# Patient Record
Sex: MALE | Race: WHITE | Employment: FULL TIME | URBAN - METROPOLITAN AREA
[De-identification: names, ages, dates, MRNs, and addresses within clinical notes are randomized per-mention and may not be internally consistent; named-entity substitution may affect disease eponyms.]

---

## 2018-06-20 NOTE — PROGRESS NOTES
SUBJECTIVE:   Kain Varela is a 22 year old male who presents to clinic today for the following health issues:      Diabetes Follow-up      Patient is checking blood sugars: few times a week, average in low 200    Diabetic concerns: None     Symptoms of hypoglycemia (low blood sugar): none     Paresthesias (numbness or burning in feet) or sores: No     Date of last diabetic eye exam: about 1 year ago    He has not brought his glucometer today.  Used to have a PCP at [facility] ([facility]).  He has not seen a Diabetic Educator.  He does have health insurance now.   The soonest Endo appointment is on 10.31.18.    Patient takes 26 units of the long acting insulin (he has self decreased from the previous dose of the lantus, due to episodes of hypoglycemia).  Short acting insulin: currently uses as 1 unit per 9 grams of carbohydrates typically,the patient uses 60 units of this per day.   Patient has lows < 80 about once a week.       Hyperlipidemia Follow-Up      Rate your low fat/cholesterol diet?: fair    Taking statin?  No    Other lipid medications/supplements?:  none    Hypertension Follow-up      Outpatient blood pressures are not being checked.    Low Salt Diet: not monitoring salt    BP Readings from Last 2 Encounters:   07/05/18 131/84   07/23/15 129/72     Hemoglobin A1C (%)   Date Value   07/05/2018 12.2 (H)   03/13/2015 10.0 (H)     LDL Cholesterol Calculated (mg/dL)   Date Value   09/02/2014 86   07/18/2006 60     LDL Cholesterol Direct (mg/dL)   Date Value   07/05/2018 94       Amount of exercise or physical activity: Bikes to and from work daily     Problems taking medications regularly: No    Medication side effects: none    Diet: regular (no restrictions)          Reviewed and updated as needed this visit by clinical staff  Tobacco  Allergies  Meds  Problems  Med Hx  Surg Hx  Fam Hx  Soc Hx        Reviewed and updated as needed this visit by Provider  Meds  Problems      "    Patient Active Problem List   Diagnosis     Type 1 diabetes mellitus (H)     Uncontrolled type 1 diabetes mellitus (H)     Midline low back pain without sciatica     Neck pain     Arthralgia     Encounter for long-term (current) use of insulin (H)     Type 1 diabetes, HbA1c goal < 7% (H)       Past Medical History:   Diagnosis Date     Type 1 diabetes mellitus (H)        Past Surgical History:   Procedure Laterality Date     NO HISTORY OF SURGERY         Family History   Problem Relation Age of Onset     Asthma Mother        Social History   Substance Use Topics     Smoking status: Never Smoker     Smokeless tobacco: Never Used     Alcohol use No       Current Outpatient Prescriptions   Medication     ACE/ARB/ARNI NOT PRESCRIBED, INTENTIONAL,     busPIRone (BUSPAR) 15 MG tablet     FLUoxetine (PROZAC) 20 MG capsule     fluticasone (FLONASE) 50 MCG/ACT spray     glucagon (GLUCAGON EMERGENCY) 1 MG KIT     insulin syringe-needle U-100 (BD INSULIN SYRINGE ULTRAFINE) 31G X 5/16\" 0.5 ML MISC     montelukast (SINGULAIR) 10 MG tablet     [DISCONTINUED] insulin aspart (NOVOLOG) 100 UNITS/ML VIAL     [DISCONTINUED] insulin glargine (LANTUS VIAL) 100 UNIT/ML vial     insulin aspart (NOVOLOG) 100 UNITS/ML injection     insulin glargine (LANTUS VIAL) 100 UNIT/ML injection     [DISCONTINUED] insulin glargine (LANTUS VIAL) 100 UNIT/ML injection     No current facility-administered medications for this visit.          ROS:  Constitutional, HEENT, cardiovascular, pulmonary, GI, , musculoskeletal, neuro, skin, endocrine and psych systems are negative, except as otherwise noted.     OBJECTIVE:                                                    /84  Pulse 93  Temp 97.9  F (36.6  C) (Oral)  Ht 5' 7.91\" (1.725 m)  Wt 147 lb (66.7 kg)  SpO2 97%  BMI 22.41 kg/m2     GENERAL APPEARANCE: healthy, alert and in no distress  EYES: Eyes grossly normal to inspection, and conjunctivae and sclerae normal  HENT: head normocephalic " and atraumatic and mouth without ulcers or lesions, oropharynx clear and oral mucous membranes moist  NECK: no noticeable adenopathy, no asymmetry, masses, or scars   RESP: lungs clear to auscultation - no rales, rhonchi or wheezes  CV: regular rate and rhythm, normal S1 S2, no S3 or S4, no murmur, click or rub, no peripheral edema and peripheral pulses strong  ABDOMEN: soft, nontender, no hepatosplenomegaly, no masses and bowel sounds normal  MS: no musculoskeletal defects are noted and gait is age appropriate without ataxia  SKIN: no suspicious lesions or rashes  NEURO: mentation intact and speech normal  PSYCH: mentation appears normal and affect normal/bright.    Results for orders placed or performed in visit on 07/05/18   HIV Screening   Result Value Ref Range    HIV Antigen Antibody Combo Nonreactive NR^Nonreactive       CREATININE   Result Value Ref Range    Creatinine 0.77 0.66 - 1.25 mg/dL    GFR Estimate >90 >60 mL/min/1.7m2    GFR Estimate If Black >90 >60 mL/min/1.7m2   HEMOGLOBIN A1C   Result Value Ref Range    Hemoglobin A1C 12.2 (H) 0 - 5.6 %   TSH with free T4 reflex   Result Value Ref Range    TSH 1.76 0.40 - 4.00 mU/L   LDL cholesterol direct   Result Value Ref Range    LDL Cholesterol Direct 94 <100 mg/dL       No results found for this or any previous visit (from the past 744 hour(s)).      ASSESSMENT/PLAN:                                                        ICD-10-CM    1. Type 1 diabetes mellitus without complication (H) E10.9 CREATININE     HEMOGLOBIN A1C     ACE/ARB/ARNI NOT PRESCRIBED, INTENTIONAL,     TSH with free T4 reflex     LDL cholesterol direct   2. Encounter for long-term (current) use of insulin (H) Z79.4    3. Screening for HIV (human immunodeficiency virus) Z11.4 HIV Screening   4. Screening for diabetic peripheral neuropathy Z13.89    5. Need for prophylactic vaccination against Streptococcus pneumoniae (pneumococcus) Z23    6. Need for prophylactic vaccination with  tetanus-diphtheria (TD) Z23      Worsening control of T1DM:  See the July 5, 2018 telephone encounter.      Patient Instructions   We will let you know your test results as discussed: by phone for urgent results, otherwise by postal mail.    I will be able to send the prescription for your insulins once today's lab tests process.     Please return to clinic in 2 weeks for a follow-up appointment for your Diabetes.           Amy Singh MD    [place] [address] [facility] [facility] [address] [phone]

## 2018-07-05 ENCOUNTER — TELEPHONE (OUTPATIENT)
Dept: INTERNAL MEDICINE | Facility: CLINIC | Age: 23
End: 2018-07-05

## 2018-07-05 ENCOUNTER — OFFICE VISIT (OUTPATIENT)
Dept: INTERNAL MEDICINE | Facility: CLINIC | Age: 23
End: 2018-07-05
Payer: COMMERCIAL

## 2018-07-05 VITALS
HEIGHT: 68 IN | DIASTOLIC BLOOD PRESSURE: 84 MMHG | WEIGHT: 147 LBS | SYSTOLIC BLOOD PRESSURE: 131 MMHG | HEART RATE: 93 BPM | TEMPERATURE: 97.9 F | OXYGEN SATURATION: 97 % | BODY MASS INDEX: 22.28 KG/M2

## 2018-07-05 DIAGNOSIS — Z23 NEED FOR PROPHYLACTIC VACCINATION AGAINST STREPTOCOCCUS PNEUMONIAE (PNEUMOCOCCUS): ICD-10-CM

## 2018-07-05 DIAGNOSIS — Z23 NEED FOR PROPHYLACTIC VACCINATION WITH TETANUS-DIPHTHERIA (TD): ICD-10-CM

## 2018-07-05 DIAGNOSIS — Z11.4 SCREENING FOR HIV (HUMAN IMMUNODEFICIENCY VIRUS): ICD-10-CM

## 2018-07-05 DIAGNOSIS — Z79.4 ENCOUNTER FOR LONG-TERM (CURRENT) USE OF INSULIN (H): ICD-10-CM

## 2018-07-05 DIAGNOSIS — E10.9 TYPE 1 DIABETES MELLITUS WITHOUT COMPLICATION (H): Primary | ICD-10-CM

## 2018-07-05 DIAGNOSIS — Z13.89 SCREENING FOR DIABETIC PERIPHERAL NEUROPATHY: ICD-10-CM

## 2018-07-05 LAB
CREAT SERPL-MCNC: 0.77 MG/DL (ref 0.66–1.25)
GFR SERPL CREATININE-BSD FRML MDRD: >90 ML/MIN/1.7M2
HBA1C MFR BLD: 12.2 % (ref 0–5.6)
HIV 1+2 AB+HIV1 P24 AG SERPL QL IA: NONREACTIVE
LDLC SERPL DIRECT ASSAY-MCNC: 94 MG/DL
TSH SERPL DL<=0.005 MIU/L-ACNC: 1.76 MU/L (ref 0.4–4)

## 2018-07-05 PROCEDURE — 83036 HEMOGLOBIN GLYCOSYLATED A1C: CPT | Performed by: INTERNAL MEDICINE

## 2018-07-05 PROCEDURE — 84443 ASSAY THYROID STIM HORMONE: CPT | Performed by: INTERNAL MEDICINE

## 2018-07-05 PROCEDURE — 82565 ASSAY OF CREATININE: CPT | Performed by: INTERNAL MEDICINE

## 2018-07-05 PROCEDURE — 36415 COLL VENOUS BLD VENIPUNCTURE: CPT | Performed by: INTERNAL MEDICINE

## 2018-07-05 PROCEDURE — 99214 OFFICE O/P EST MOD 30 MIN: CPT | Performed by: INTERNAL MEDICINE

## 2018-07-05 PROCEDURE — 87389 HIV-1 AG W/HIV-1&-2 AB AG IA: CPT | Performed by: INTERNAL MEDICINE

## 2018-07-05 PROCEDURE — 83721 ASSAY OF BLOOD LIPOPROTEIN: CPT | Performed by: INTERNAL MEDICINE

## 2018-07-05 RX ORDER — BUSPIRONE HYDROCHLORIDE 15 MG/1
15 TABLET ORAL 3 TIMES DAILY
COMMUNITY
Start: 2017-10-26 | End: 2019-03-15

## 2018-07-05 RX ORDER — FLUTICASONE PROPIONATE 50 MCG
2 SPRAY, SUSPENSION (ML) NASAL 2 TIMES DAILY PRN
COMMUNITY
Start: 2017-06-20

## 2018-07-05 RX ORDER — INSULIN GLARGINE 100 [IU]/ML
INJECTION, SOLUTION SUBCUTANEOUS
Qty: 10 ML | Refills: 1 | Status: SHIPPED | OUTPATIENT
Start: 2018-07-05 | End: 2018-07-06

## 2018-07-05 RX ORDER — INSULIN GLARGINE 100 [IU]/ML
INJECTION, SOLUTION SUBCUTANEOUS
Qty: 10 ML | Refills: 1 | Status: SHIPPED | OUTPATIENT
Start: 2018-07-05 | End: 2018-07-05

## 2018-07-05 RX ORDER — INSULIN GLARGINE 100 [IU]/ML
INJECTION, SOLUTION SUBCUTANEOUS DAILY
Qty: 30 ML | Refills: 1 | Status: CANCELLED | OUTPATIENT
Start: 2018-07-05

## 2018-07-05 RX ORDER — MONTELUKAST SODIUM 10 MG/1
10 TABLET ORAL DAILY
COMMUNITY
Start: 2017-06-20

## 2018-07-05 NOTE — MR AVS SNAPSHOT
After Visit Summary   7/5/2018    Kain Varela    MRN: [id number]           Patient Information     Date Of Birth          1995        Visit Information        Provider Department      7/5/2018 8:10 AM Amy Singh MD [facility]        Today's Diagnoses     Type 1 diabetes mellitus without complication (H)    -  1    Screening for HIV (human immunodeficiency virus)        Screening for diabetic peripheral neuropathy        Need for prophylactic vaccination against Streptococcus pneumoniae (pneumococcus)        Need for prophylactic vaccination with tetanus-diphtheria (TD)          Care Instructions    We will let you know your test results as discussed: by phone for urgent results, otherwise by postal mail.    I will be able to send the prescription for your insulins once today's lab tests process.     Please return to clinic in 2 weeks for a follow-up appointment for your Diabetes.               Follow-ups after your visit        Your next 10 appointments already scheduled     Oct 31, 2018  9:00 AM CDT   New Visit with Kiki Campos MD   [facility]    [address] [place] [address] [phone]              Who to contact     If you have questions or need follow up information about today's clinic visit or your schedule please contact [facility] directly at [phone].  Normal or non-critical lab and imaging results will be communicated to you by MyChart, letter or phone within 4 business days after the clinic has received the results. If you do not hear from us within 7 days, please contact the clinic through MyChart or phone. If you have a critical or abnormal lab result, we will notify you by phone as soon as possible.  Submit refill requests through [employer] or call your pharmacy and they will forward the refill request to us. Please allow 3 business days for your  "refill to be completed.          Additional Information About Your Visit        Care EveryWhere ID     This is your Care EveryWhere ID. This could be used by other organizations to access your [place] medical records  [id number]        Your Vitals Were     Pulse Temperature Height Pulse Oximetry BMI (Body Mass Index)       93 97.9  F (36.6  C) (Oral) 5' 7.91\" (1.725 m) 97% 22.41 kg/m2        Blood Pressure from Last 3 Encounters:   07/05/18 131/84   07/23/15 129/72   07/16/15 138/80    Weight from Last 3 Encounters:   07/05/18 147 lb (66.7 kg)   07/23/15 156 lb (70.8 kg) (51 %)*   07/16/15 158 lb (71.7 kg) (54 %)*     * Growth percentiles are based on [facility] 2-20 Years data.              We Performed the Following     CREATININE     HEMOGLOBIN A1C     HIV Screening     LDL cholesterol direct     TSH with free T4 reflex          Today's Medication Changes          These changes are accurate as of 7/5/18  9:00 AM.  If you have any questions, ask your nurse or doctor.               Start taking these medicines.        Dose/Directions    ACE/ARB/ARNI NOT PRESCRIBED (INTENTIONAL)   Used for:  Type 1 diabetes mellitus without complication (H)   Started by:  Amy Singh MD        Please choose reason not prescribed, below   Refills:  0            Where to get your medicines      Some of these will need a paper prescription and others can be bought over the counter.  Ask your nurse if you have questions.     You don't need a prescription for these medications     ACE/ARB/ARNI NOT PRESCRIBED (INTENTIONAL)                Primary Care Provider Office Phone # Fax #    Amy Singh [phone] [address] [address] [address]        Equal Access to Services     DOMINIK ROSE AH: Bigg Teran, brandyn noe, odalys wilson, mikayla uribe. So [facility] [place] [facility] [phone].    ATENCIÓN: Si habla español, tiene a wagner disposición " servicios gratuitos de asistencia lingüística. Mo marsh [phone].    We comply with applicable federal civil rights laws and [place] laws. We do not discriminate on the basis of race, color, national origin, age, disability, sex, sexual orientation, or gender identity.            Thank you!     Thank you for choosing [facility]  [facility] your [facility]. Our goal is always to provide you with excellent care. Hearing back from our patients is one way we can continue to improve our services. Please take a few minutes to complete the written survey that you may receive in the mail after your visit with us. Thank you!             Your Updated Medication List - Protect others around you: Learn how to safely use, store and throw away your medicines at www.disposemymeds.org.          This list is accurate as of 7/5/18  9:00 AM.  Always use your most recent med list.                   Brand Name Dispense Instructions for use Diagnosis    ACE/ARB/ARNI NOT PRESCRIBED (INTENTIONAL)      Please choose reason not prescribed, below    Type 1 diabetes mellitus without complication (H)       busPIRone 15 MG tablet    BUSPAR     Take 15 mg by mouth 3 times daily        FLUoxetine 20 MG capsule    PROzac     Take 60 mg by mouth daily        fluticasone 50 MCG/ACT spray    FLONASE     Spray 2 sprays in nostril 2 times daily as needed        glucagon 1 MG kit    GLUCAGON EMERGENCY    2 Kit    inject 1 mg as directed as needed.    Diabetes mellitus, type 1       insulin aspart 100 UNITS/ML injection    NovoLOG    30 mL    Inject  Subcutaneous. 1 unit per 8 grams of carbohydrates, up to 100 units per day.    Type I (juvenile type) diabetes mellitus without mention of complication, uncontrolled       insulin glargine 100 UNIT/ML injection    LANTUS VIAL    10 mL    32 units once daily around 7am.  Open a new vial every 30 days.    Type I (juvenile type) diabetes mellitus without mention of complication, not stated as uncontrolled  "      insulin syringe-needle U-100 31G X 5/16\" 0.5 ML    BD insulin syringe ultrafine    200 each    Use 5-7 syringes daily for insulin injections    Type I (juvenile type) diabetes mellitus without mention of complication, not stated as uncontrolled       montelukast 10 MG tablet    SINGULAIR     Take 10 mg by mouth daily          "

## 2018-07-05 NOTE — LETTER
July 11, 2018    Kain Varela  [address] [address] [address]    Dear Kain,       We have been trying to reach you by phone. Dr Singh sent 2 new insulin prescriptions to your pharmacy, replacing the lantus and novolog.          Thank you,   Your [facility] Team/  [phone]

## 2018-07-05 NOTE — PATIENT INSTRUCTIONS
We will let you know your test results as discussed: by phone for urgent results, otherwise by postal mail.    I will be able to send the prescription for your insulins once today's lab tests process.     Please return to clinic in 2 weeks for a follow-up appointment for your Diabetes.

## 2018-07-06 RX ORDER — INSULIN GLARGINE 100 [IU]/ML
INJECTION, SOLUTION SUBCUTANEOUS
Qty: 15 ML | Refills: 0 | Status: SHIPPED | OUTPATIENT
Start: 2018-07-06 | End: 2018-07-20

## 2018-07-06 NOTE — TELEPHONE ENCOUNTER
Please call and advise the patient as follows, and also send a letter with the same text and attach recent test results [statements which are in bold and in square brackets, like this sentence, is for clinic staff and should not be included in the text of the letter to the patient]:      Dear Kain,  Your diabetic control has unfortunately worsened from an A1C of approximately 10% up to 12% now.   Please increase the Lantus from 26 units to 29 units. Continue to increase the Lantus by 3 units at a time, every 2 days, until you have a blood sugar measurement of below 90.   Increase the Novolog from 1 unit of insulin  per 9 grams of carbohydrates to 1 unit per 8 grams of carbohydrates.   New prescriptions of Lantus and Novolog will be sent now to your pharmacy.   Please check your blood sugars at the very least every morning, fasting.  It would also be helpful to have a record of your premeal blood sugars.   Please bring your glucometer to each doctor s appointment and to each appointment with the Diabetic Educator.  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  We will likely refer you to a Diabetic Educator at your next appointment, to continue to work on optimizing your diabetes.   Let us know if you have any questions.  ~Dr SYED

## 2018-07-06 NOTE — TELEPHONE ENCOUNTER
I've sent the other insulin prescriptions, to be filled instead of lantus and novolog.      Thank you,  Amy Singh MD

## 2018-07-06 NOTE — TELEPHONE ENCOUNTER
Patient informed of provider note as below  Patient verbalized understanding  Patient states he had mentioned at his visit he was wanting to switch from Lantus to Basaglar and Novolog to Humulog for cost reasons, they are much cheaper for patient.    To provider to advise  Amairani LEONARDO, RN, CPN

## 2018-07-06 NOTE — TELEPHONE ENCOUNTER
"Call to patient - \"mailbox is not set up yet\", unable to leave message    Amairani DEL VALLEN, RN, CPN    "

## 2018-07-09 NOTE — TELEPHONE ENCOUNTER
"Called patient and received recording that \"Voicemail mail box has not been set up yet\".  Unable to leave message.   Will attempt again later.     Kelly DEL VALLEN, RN    "

## 2018-07-09 NOTE — TELEPHONE ENCOUNTER
Called patient again and received the same recording.   Unable to leave voicemail.    Called pharmacy where the 2 new Rxs were sent and they have not yet been picked up.  Pharmacy had a different phone number listed so I called that one and his mother answered.   She said patient works at night and sleeps during the day. He usually wakes up around 4pm.    Kelly DEL VALLEN, RN

## 2018-07-11 ENCOUNTER — TELEPHONE (OUTPATIENT)
Dept: INTERNAL MEDICINE | Facility: CLINIC | Age: 23
End: 2018-07-11

## 2018-07-11 NOTE — TELEPHONE ENCOUNTER
Prior Authorization Retail Medication Request    Medication/Dose: BASAGLAR 100 UNIT/ML injection  ICD code (if different than what is on RX):  Type 1 diabetes mellitus without complication (H) [E10.9]   Previously Tried and Failed:    Rationale:     Insurance Name:  [employer]  Insurance ID:  [id number]  BIN: [id number]  PCN: [id number]  Phone #: [phone]      Pharmacy Information (if different than what is on RX)  Name:  [facility]  Phone:  [phone]

## 2018-07-11 NOTE — TELEPHONE ENCOUNTER
RN has been unable to reach patient by phone to let him know that 2 new prescriptions have been sent to his pharmacy.    TC, please mail letter informing him that Dr Singh sent 2 new insulin prescriptions to his pharmacy, replacing the lantus and novolog.    Kelly DEL VALLEN, RN

## 2018-07-11 NOTE — TELEPHONE ENCOUNTER
Prior Authorization Not Needed per Insurance    Medication: BASAGLAR 100 UNIT/ML injection No PA needed   Insurance Company: [employer] - Phone [phone] Fax [phone]  Expected CoPay:      Pharmacy Filling the Rx: [id number] - [place], [place] - [address] [facility] [facility]  Pharmacy Notified: Yes  Patient Notified: Yes    Confirmed with pharmacy patient filled the Basagar Kwikpen recently and does not need a PA.  They also received, however, 2 other Rx's for Lantus and Humalog.  They are confused as to which one patient needs to be on before they dispense medication.  Please call pharmacy [facility] [phone] to clarify which medication patient should be on.  Thank you.

## 2018-07-12 ENCOUNTER — NURSE TRIAGE (OUTPATIENT)
Dept: NURSING | Facility: CLINIC | Age: 23
End: 2018-07-12

## 2018-07-12 NOTE — TELEPHONE ENCOUNTER
Calling because the clinic called him yesterday.  Read him the note.  He will call the pharmacy in [place]   Anuel Harrison RN [phone]

## 2018-07-18 NOTE — PROGRESS NOTES
"  SUBJECTIVE:   Kain Varela is a 22 year old male who presents to clinic today for the following health issues:      Diabetes Follow-up    Patient is checking blood sugars: twice daily.    Blood sugar testing frequency justification: On insulin, frequency appropriate   Results are as follows:         Varies: 70s (4 msmts) and 300-400s; average: 200 or above.    Diabetic concerns: None     Symptoms of hypoglycemia (low blood sugar): shaky, dizzy, weak, lethargy, blurred vision, confusion     Paresthesias (numbness or burning in feet) or sores: No     Date of last diabetic eye exam: utd    At the last appointment 2 weeks ago, we increased the long acting insulin from Lantus 26u at bedtime to Basaglar (per insurance coverage) 29 unit(s) at bedtime, and the short acting insulin from 1 unit insulin per 9 grams of carbs to 1 unit per 8 grams of carbs, tid (sent Novolog then Humalog, but the patient is saying that he picked up Novolog as this is what is covered by his insurance now).  He has had lows: 60-70s, \"almost daily,\" (on glucometer, 3-4 times in the past 2 weeks); did not have this before.   No hypoglycemic symptoms have been noted when the patient drove his car.  He has brought his glucometer with him today:  His Endo appointment is on 10.31.18.  He works 3rd shift:   Goes to sleep at 10am and wakes up at about 7pm. So his fasting blood sugars are around 7pm.     Patient feels a low blood sugar, does not always check his blood sugar at that time, but eats some candy. Does not check 15 min after, to check if he corrected or if he \"overcorrected\" his blood sugars. Also, says he is often too busy to check it at work, sometimes feels really shaky, and has to eat something.     His [employer] driving form needs to be filled out today-he had missed the deadline and had his drivers license suspended for 2 months.    Diabetes Management Resources    Hyperlipidemia Follow-Up      Rate your low " fat/cholesterol diet?: not monitoring fat    Taking statin?  No    Other lipid medications/supplements?:  none    Hypertension Follow-up      Outpatient blood pressures are not being checked.    Low Salt Diet: NA    BP Readings from Last 2 Encounters:   07/20/18 124/68   07/05/18 131/84     Hemoglobin A1C (%)   Date Value   07/05/2018 12.2 (H)   03/13/2015 10.0 (H)     LDL Cholesterol Calculated (mg/dL)   Date Value   09/02/2014 86   07/18/2006 60     LDL Cholesterol Direct (mg/dL)   Date Value   07/05/2018 94       Amount of exercise or physical activity: 4-5 days/week for an average of 45-60 minutes    Problems taking medications regularly: No    Medication side effects: none    Diet: carbohydrate counting        Reviewed and updated as needed this visit by clinical staff  Tobacco  Allergies  Meds  Problems  Med Hx  Surg Hx  Fam Hx  Soc Hx        Reviewed and updated as needed this visit by Provider  Meds  Problems           Patient Active Problem List   Diagnosis     Type 1 diabetes mellitus (H)     Uncontrolled type 1 diabetes mellitus (H)     Midline low back pain without sciatica     Neck pain     Arthralgia     Encounter for long-term (current) use of insulin (H)     Type 1 diabetes, HbA1c goal < 7% (H)       Past Medical History:   Diagnosis Date     Type 1 diabetes mellitus (H)        Past Surgical History:   Procedure Laterality Date     NO HISTORY OF SURGERY         Family History   Problem Relation Age of Onset     Asthma Mother        Social History   Substance Use Topics     Smoking status: Never Smoker     Smokeless tobacco: Never Used     Alcohol use No       Current Outpatient Prescriptions   Medication     ACE/ARB/ARNI NOT PRESCRIBED, INTENTIONAL,     BASAGLAR 100 UNIT/ML injection     busPIRone (BUSPAR) 15 MG tablet     FLUoxetine (PROZAC) 20 MG capsule     fluticasone (FLONASE) 50 MCG/ACT spray     glucagon (GLUCAGON EMERGENCY) 1 MG KIT     insulin aspart (NOVOLOG VIAL) 100 UNITS/ML  "injection     insulin syringe-needle U-100 (BD INSULIN SYRINGE ULTRAFINE) 31G X 5/16\" 0.5 ML MISC     montelukast (SINGULAIR) 10 MG tablet     No current facility-administered medications for this visit.          ROS:  Constitutional, HEENT, cardiovascular, pulmonary, GI, , musculoskeletal, neuro, skin, endocrine and psych systems are negative, except as otherwise noted.     OBJECTIVE:                                                    /68  Pulse 76  Temp 97.9  F (36.6  C) (Oral)  Wt 152 lb (68.9 kg)  SpO2 99%  BMI 23.17 kg/m2     GENERAL APPEARANCE: healthy, alert and in no distress  EYES: Eyes grossly normal to inspection, and conjunctivae and sclerae normal  HENT: head normocephalic and atraumatic and mouth without ulcers or lesions, oropharynx clear and oral mucous membranes moist  NECK: no noticeable adenopathy, no asymmetry, masses, or scars   RESP: lungs clear to auscultation - no rales, rhonchi or wheezes  CV: regular rate and rhythm, normal S1 S2, no S3 or S4, no murmur, click or rub, no peripheral edema and peripheral pulses strong  ABDOMEN: soft, nontender, no hepatosplenomegaly, no masses and bowel sounds normal  MS: no musculoskeletal defects are noted and gait is age appropriate without ataxia  SKIN: no suspicious lesions or rashes  NEURO: mentation intact and speech normal  PSYCH: mentation appears normal and affect normal/bright.    Results for orders placed or performed in visit on 07/05/18   HIV Screening   Result Value Ref Range    HIV Antigen Antibody Combo Nonreactive NR^Nonreactive       CREATININE   Result Value Ref Range    Creatinine 0.77 0.66 - 1.25 mg/dL    GFR Estimate >90 >60 mL/min/1.7m2    GFR Estimate If Black >90 >60 mL/min/1.7m2   HEMOGLOBIN A1C   Result Value Ref Range    Hemoglobin A1C 12.2 (H) 0 - 5.6 %   TSH with free T4 reflex   Result Value Ref Range    TSH 1.76 0.40 - 4.00 mU/L   LDL cholesterol direct   Result Value Ref Range    LDL Cholesterol Direct 94 <100 " mg/dL       No results found for this or any previous visit (from the past 744 hour(s)).      ASSESSMENT/PLAN:                                                        ICD-10-CM    1. Type 1 diabetes mellitus without complication (H) E10.9 insulin aspart (NOVOLOG VIAL) 100 UNITS/ML injection     BASAGLAR 100 UNIT/ML injection   2. Uncontrolled type 1 diabetes mellitus without complication (H) E10.65    3. Screening for diabetic peripheral neuropathy Z13.89    4. Need for prophylactic vaccination against Streptococcus pneumoniae (pneumococcus) Z23    5. Need for prophylactic vaccination with tetanus-diphtheria (TD) Z23        Patient Instructions   Continue your current Basaglar and Novolog.    Please try to eat a 15 gram carbohydrate snack (2-3 snacks per 24 hour day) at times of the day that you are likely to get low blood sugars.    When you do feel symptoms of low blood sugars, try to check your sugar, eat a snack and recheck your sugar 15 minutes later. That way, you will know 1) if you are really low or not 2) if the snack that you ate corrected your blood sugar, or was it too much or too little.    Please call to schedule an appointment with the Diabetic Educator, soon: [phone].    Please return to clinic in 3 weeks and bring your glucometer with you.              Amy Singh MD    [facility]  [address] [address] [facility] [address] [phone]

## 2018-07-20 ENCOUNTER — OFFICE VISIT (OUTPATIENT)
Dept: INTERNAL MEDICINE | Facility: CLINIC | Age: 23
End: 2018-07-20
Payer: COMMERCIAL

## 2018-07-20 VITALS
SYSTOLIC BLOOD PRESSURE: 124 MMHG | HEART RATE: 76 BPM | OXYGEN SATURATION: 99 % | BODY MASS INDEX: 23.17 KG/M2 | WEIGHT: 152 LBS | DIASTOLIC BLOOD PRESSURE: 68 MMHG | TEMPERATURE: 97.9 F

## 2018-07-20 DIAGNOSIS — Z23 NEED FOR PROPHYLACTIC VACCINATION WITH TETANUS-DIPHTHERIA (TD): ICD-10-CM

## 2018-07-20 DIAGNOSIS — E10.9 TYPE 1 DIABETES MELLITUS WITHOUT COMPLICATION (H): Primary | ICD-10-CM

## 2018-07-20 DIAGNOSIS — Z13.89 SCREENING FOR DIABETIC PERIPHERAL NEUROPATHY: ICD-10-CM

## 2018-07-20 DIAGNOSIS — Z23 NEED FOR PROPHYLACTIC VACCINATION AGAINST STREPTOCOCCUS PNEUMONIAE (PNEUMOCOCCUS): ICD-10-CM

## 2018-07-20 PROCEDURE — 99214 OFFICE O/P EST MOD 30 MIN: CPT | Performed by: INTERNAL MEDICINE

## 2018-07-20 RX ORDER — INSULIN GLARGINE 100 [IU]/ML
INJECTION, SOLUTION SUBCUTANEOUS
Qty: 15 ML | Refills: 0 | Status: SHIPPED | OUTPATIENT
Start: 2018-07-20 | End: 2019-06-17

## 2018-07-20 NOTE — PATIENT INSTRUCTIONS
Continue your current Basaglar and Novolog.    Please try to eat a 15 gram carbohydrate snack (2-3 snacks per 24 hour day) at times of the day that you are likely to get low blood sugars.    When you do feel symptoms of low blood sugars, try to check your sugar, eat a snack and recheck your sugar 15 minutes later. That way, you will know 1) if you are really low or not 2) if the snack that you ate corrected your blood sugar, or was it too much or too little.    Please call to schedule an appointment with the Diabetic Educator, soon: [phone].    Please return to clinic in 3 weeks and bring your glucometer with you.

## 2018-07-20 NOTE — MR AVS SNAPSHOT
After Visit Summary   7/20/2018    Kain Varela    MRN: [id number]           Patient Information     Date Of Birth          1995        Visit Information        Provider Department      7/20/2018 8:10 AM Amy Singh MD [facility]        Today's Diagnoses     Type 1 diabetes mellitus without complication (H)    -  1    Screening for diabetic peripheral neuropathy        Need for prophylactic vaccination against Streptococcus pneumoniae (pneumococcus)        Need for prophylactic vaccination with tetanus-diphtheria (TD)          Care Instructions    Continue your current Basaglar and Novolog.    Please try to eat a 15 gram carbohydrate snack (2-3 snacks per 24 hour day) at times of the day that you are likely to get low blood sugars.    When you do feel symptoms of low blood sugars, try to check your sugar, eat a snack and recheck your sugar 15 minutes later. That way, you will know 1) if you are really low or not 2) if the snack that you ate corrected your blood sugar, or was it too much or too little.    Please call to schedule an appointment with the Diabetic Educator, soon: [phone].    Please return to clinic in 3 weeks and bring your glucometer with you.                  Follow-ups after your visit        Your next 10 appointments already scheduled     Oct 31, 2018  9:00 AM CDT   New Visit with Kiki Campos MD   [facility] ([facility])    [address] [place] [address] [phone]              Who to contact     If you have questions or need follow up information about today's clinic visit or your schedule please contact [facility] directly at [phone].  Normal or non-critical lab and imaging results will be communicated to you by MyChart, letter or phone within 4 business days after the clinic has received the results. If you do not hear from us within 7 days, please  contact the clinic through eReceipts or phone. If you have a critical or abnormal lab result, we will notify you by phone as soon as possible.  Submit refill requests through eReceipts or call your pharmacy and they will forward the refill request to us. Please allow 3 business days for your refill to be completed.          Additional Information About Your Visit        Care EveryWhere ID     This is your Care EveryWhere ID. This could be used by other organizations to access your [facility] medical records  [id number]        Your Vitals Were     Pulse Temperature Pulse Oximetry BMI (Body Mass Index)          76 97.9  F (36.6  C) (Oral) 99% 23.17 kg/m2         Blood Pressure from Last 3 Encounters:   07/20/18 124/68   07/05/18 131/84   07/23/15 129/72    Weight from Last 3 Encounters:   07/20/18 152 lb (68.9 kg)   07/05/18 147 lb (66.7 kg)   07/23/15 156 lb (70.8 kg) (51 %)*     * Growth percentiles are based on [facility] 2-20 Years data.              Today, you had the following     No orders found for display         Today's Medication Changes          These changes are accurate as of 7/20/18  9:19 AM.  If you have any questions, ask your nurse or doctor.               These medicines have changed or have updated prescriptions.        Dose/Directions    insulin aspart 100 UNITS/ML injection   Commonly known as:  NovoLOG VIAL   Indication:  1 unit per 8 g of carbs   This may have changed:    - how to take this  - when to take this  - additional instructions   Used for:  Type 1 diabetes mellitus without complication (H)   Changed by:  Amy Singh MD        1 unit insulin subcutaneous, per 8 grams of carbohydrates typically. Max 100 units of this per day.   Quantity:  15 mL   Refills:  0         Stop taking these medicines if you haven't already. Please contact your care team if you have questions.     insulin lispro 100 UNIT/ML injection   Commonly known as:  HumaLOG KWIKpen   Stopped by:  Amy Singh  MD Mildred                Where to get your medicines      These medications were sent to [facility] #[id number] - [place], [place] - [address],  [address]  [address], , [facility] [address]     Phone:  [phone]     BASAGLAR 100 UNIT/ML injection    insulin aspart 100 UNITS/ML injection                Primary Care Provider Office Phone # Fax #    Amy Singh [phone] [address] [address] [facility] [address]        Equal Access to Services     [facility]: Hadii aad ku hadasho Soomaali, waaxda luqadaha, qaybta kaalmada adeegyada, mikayla gan . So [facility] [phone].    ATENCIÓN: Si habla español, tiene a wagner disposición servicios gratuitos de asistencia lingüística. [facility] [phone].    We comply with applicable federal civil rights laws and [place] laws. We do not discriminate on the basis of race, color, national origin, age, disability, sex, sexual orientation, or gender identity.            Thank you!     Thank you for choosing [facility]  for your care. Our goal is always to provide you with excellent care. Hearing back from our patients is one way we can continue to improve our services. Please take a few minutes to complete the written survey that you may receive in the mail after your visit with us. Thank you!             Your Updated Medication List - Protect others around you: Learn how to safely use, store and throw away your medicines at www.disposemymeds.org.          This list is accurate as of 7/20/18  9:19 AM.  Always use your most recent med list.                   Brand Name Dispense Instructions for use Diagnosis    ACE/ARB/ARNI NOT PRESCRIBED (INTENTIONAL)      Please choose reason not prescribed, below    Type 1 diabetes mellitus without complication (H)       BASAGLAR 100 UNIT/ML injection     15 mL    29 units once SQ daily.  Open a new vial every 30 days.  "   Type 1 diabetes mellitus without complication (H)       busPIRone 15 MG tablet    BUSPAR     Take 15 mg by mouth 3 times daily        FLUoxetine 20 MG capsule    PROzac     Take 60 mg by mouth daily        fluticasone 50 MCG/ACT spray    FLONASE     Spray 2 sprays in nostril 2 times daily as needed        glucagon 1 MG kit    GLUCAGON EMERGENCY    2 Kit    inject 1 mg as directed as needed.    Diabetes mellitus, type 1       insulin aspart 100 UNITS/ML injection    NovoLOG VIAL    15 mL    1 unit insulin subcutaneous, per 8 grams of carbohydrates typically. Max 100 units of this per day.    Type 1 diabetes mellitus without complication (H)       insulin syringe-needle U-100 31G X 5/16\" 0.5 ML    BD insulin syringe ultrafine    200 each    Use 5-7 syringes daily for insulin injections    Type I (juvenile type) diabetes mellitus without mention of complication, not stated as uncontrolled       montelukast 10 MG tablet    SINGULAIR     Take 10 mg by mouth daily          "

## 2018-07-24 ENCOUNTER — TELEPHONE (OUTPATIENT)
Dept: INTERNAL MEDICINE | Facility: CLINIC | Age: 23
End: 2018-07-24

## 2018-07-25 ENCOUNTER — TELEPHONE (OUTPATIENT)
Dept: INTERNAL MEDICINE | Facility: CLINIC | Age: 23
End: 2018-07-25

## 2018-08-23 NOTE — PROGRESS NOTES
"  SUBJECTIVE:   Kain Varela is a 23 year old male who presents to clinic today for the following health issues:      Diabetes Follow-up    Patient is checking blood sugars: twice daily.    Blood sugar testing frequency justification: On insulin, frequency appropriate   Results are as follows:         am - 70         bedtime - 150    Diabetic concerns: None and low blood sugar several less than 70 in the past few weeks     Symptoms of hypoglycemia (low blood sugar): shaky, weak sweaty     Paresthesias (numbness or burning in feet) or sores: No     Date of last diabetic eye exam: yes    Patient apologies for forgetting his glucometer-checked his sugar this morning and forgot to put back in his backpack.    From his memory: blood sugars have been high 60s to low 200s. This is an improvement from the last OV visit a month ago, where his BS would go into the 300s and above.  See my previous OV note:   He has checked several times when he felt shaky if he was low or not and his BS were in fact in the 60s. He has started eating 15 gram carbohydrate snacks in the morning, which may have made hypoglycemic episodes less often, though still gets them on the very busy mornings.   he does not check his blood sugars 15 min after he eats the morning 15 gram carbohydrate snack, (to see if he corrected or if he \"overcorrected\" his blood sugars).      Diabetes Management Resources    Hyperlipidemia Follow-Up      Rate your low fat/cholesterol diet?: not monitoring fat    Taking statin?  No    Other lipid medications/supplements?:  none    Hypertension Follow-up      Outpatient blood pressures are not being checked.    Low Salt Diet: not monitoring salt    BP Readings from Last 2 Encounters:   08/24/18 128/67   07/20/18 124/68     Hemoglobin A1C (%)   Date Value   07/05/2018 12.2 (H)   03/13/2015 10.0 (H)     LDL Cholesterol Calculated (mg/dL)   Date Value   09/02/2014 86   07/18/2006 60     LDL Cholesterol Direct (mg/dL)   Date " "Value   07/05/2018 94       Amount of exercise or physical activity: bike to work and back    Problems taking medications regularly: No    Medication side effects: none    Diet: carbohydrate counting          Reviewed and updated as needed this visit by clinical staff  Tobacco  Allergies  Meds  Problems  Med Hx  Surg Hx  Fam Hx  Soc Hx        Reviewed and updated as needed this visit by Provider  Meds  Problems         Patient Active Problem List   Diagnosis     Type 1 diabetes mellitus (H)     Uncontrolled type 1 diabetes mellitus (H)     Midline low back pain without sciatica     Neck pain     Arthralgia     Encounter for long-term (current) use of insulin (H)     Type 1 diabetes, HbA1c goal < 7% (H)       Past Medical History:   Diagnosis Date     Type 1 diabetes mellitus (H)        Past Surgical History:   Procedure Laterality Date     NO HISTORY OF SURGERY         Family History   Problem Relation Age of Onset     Asthma Mother        Social History   Substance Use Topics     Smoking status: Never Smoker     Smokeless tobacco: Never Used     Alcohol use No       Current Outpatient Prescriptions   Medication     ACE/ARB/ARNI NOT PRESCRIBED, INTENTIONAL,     BASAGLAR 100 UNIT/ML injection     blood glucose monitoring (NO BRAND SPECIFIED) test strip     busPIRone (BUSPAR) 15 MG tablet     FLUoxetine (PROZAC) 20 MG capsule     fluticasone (FLONASE) 50 MCG/ACT spray     glucagon (GLUCAGON EMERGENCY) 1 MG KIT     insulin aspart (NOVOLOG VIAL) 100 UNITS/ML injection     insulin syringe-needle U-100 (BD INSULIN SYRINGE ULTRAFINE) 31G X 5/16\" 0.5 ML MISC     montelukast (SINGULAIR) 10 MG tablet     No current facility-administered medications for this visit.          ROS:  Constitutional, HEENT, cardiovascular, pulmonary, GI, , musculoskeletal, neuro, skin, endocrine and psych systems are negative, except as otherwise noted.     OBJECTIVE:                                                    /67  Pulse " 83  Temp 98.1  F (36.7  C) (Oral)  Resp 16  Wt 154 lb (69.9 kg)  SpO2 97%  BMI 23.48 kg/m2     GENERAL APPEARANCE: healthy, alert and in no distress  EYES: Eyes grossly normal to inspection, and conjunctivae and sclerae normal  HENT: head normocephalic and atraumatic and mouth without ulcers or lesions, oropharynx clear and oral mucous membranes moist  NECK: no noticeable adenopathy, no asymmetry, masses, or scars   RESP: lungs clear to auscultation - no rales, rhonchi or wheezes  CV: regular rate and rhythm, normal S1 S2, no S3 or S4, no murmur, click or rub, no peripheral edema and peripheral pulses strong  ABDOMEN: soft, nontender, no hepatosplenomegaly, no masses and bowel sounds normal  MS: no musculoskeletal defects are noted and gait is age appropriate without ataxia  SKIN: no suspicious lesions or rashes  NEURO: mentation intact and speech normal  PSYCH: mentation appears normal and affect normal/bright.    Results for orders placed or performed in visit on 07/05/18   HIV Screening   Result Value Ref Range    HIV Antigen Antibody Combo Nonreactive NR^Nonreactive       CREATININE   Result Value Ref Range    Creatinine 0.77 0.66 - 1.25 mg/dL    GFR Estimate >90 >60 mL/min/1.7m2    GFR Estimate If Black >90 >60 mL/min/1.7m2   HEMOGLOBIN A1C   Result Value Ref Range    Hemoglobin A1C 12.2 (H) 0 - 5.6 %   TSH with free T4 reflex   Result Value Ref Range    TSH 1.76 0.40 - 4.00 mU/L   LDL cholesterol direct   Result Value Ref Range    LDL Cholesterol Direct 94 <100 mg/dL       No results found for this or any previous visit (from the past 744 hour(s)).      ASSESSMENT/PLAN:                                                        ICD-10-CM    1. Uncontrolled type 1 diabetes mellitus without complication (H) E10.65    2. Encounter for long-term (current) use of insulin (H) Z79.4    3. Screening for diabetic peripheral neuropathy Z13.89 FOOT EXAM  NO CHARGE [81190.114]       Patient Instructions   Continue your  current Basaglar and Novolog.     Please call to schedule an appointment with the Diabetic Educator, soon: [phone].    Continue to eat a 15 gram carbohydrate snack (2-3 snacks per 24 hour day) at times of the day that you are likely to get low blood sugars.     When you do feel symptoms of low blood sugars, try to check your sugar, eat a snack and recheck your sugar 15 minutes later. That way, you will know 1) if you are really low or not 2) if the snack that you ate corrected your blood sugar, or was it too much or too little.     I would advise the next primary care visit in 2-3 months-please establish with a new primary care provider.       Amy Singh MD    [place] [address] [facility] [address] [phone]

## 2018-08-24 ENCOUNTER — OFFICE VISIT (OUTPATIENT)
Dept: INTERNAL MEDICINE | Facility: CLINIC | Age: 23
End: 2018-08-24
Payer: COMMERCIAL

## 2018-08-24 VITALS
SYSTOLIC BLOOD PRESSURE: 128 MMHG | TEMPERATURE: 98.1 F | WEIGHT: 154 LBS | DIASTOLIC BLOOD PRESSURE: 67 MMHG | OXYGEN SATURATION: 97 % | RESPIRATION RATE: 16 BRPM | HEART RATE: 83 BPM | BODY MASS INDEX: 23.48 KG/M2

## 2018-08-24 DIAGNOSIS — Z13.89 SCREENING FOR DIABETIC PERIPHERAL NEUROPATHY: ICD-10-CM

## 2018-08-24 DIAGNOSIS — Z79.4 ENCOUNTER FOR LONG-TERM (CURRENT) USE OF INSULIN (H): ICD-10-CM

## 2018-08-24 PROCEDURE — 99207 C FOOT EXAM  NO CHARGE: CPT | Mod: 25 | Performed by: INTERNAL MEDICINE

## 2018-08-24 PROCEDURE — 99214 OFFICE O/P EST MOD 30 MIN: CPT | Performed by: INTERNAL MEDICINE

## 2018-08-24 ASSESSMENT — PAIN SCALES - GENERAL: PAINLEVEL: NO PAIN (0)

## 2018-08-24 NOTE — NURSING NOTE
"Chief Complaint   Patient presents with     Diabetes     Health Maintenance     orders pended, update immunizations       Initial /67  Pulse 83  Temp 98.1  F (36.7  C) (Oral)  Resp 16  Wt 154 lb (69.9 kg)  SpO2 97%  BMI 23.48 kg/m2 Estimated body mass index is 23.48 kg/(m^2) as calculated from the following:    Height as of 7/5/18: 5' 7.91\" (1.725 m).    Weight as of this encounter: 154 lb (69.9 kg).  Medication Reconciliation: complete  Suyapa Nathan, WALLACE  "

## 2018-08-24 NOTE — MR AVS SNAPSHOT
After Visit Summary   8/24/2018    Kain Varela    MRN: [id number]           Patient Information     Date Of Birth          1995        Visit Information        Provider Department      8/24/2018 8:10 AM Amy Singh MD [facility]        Today's Diagnoses     Screening for diabetic peripheral neuropathy    -  1      Care Instructions    Continue your current Basaglar and Novolog.     Please call to schedule an appointment with the Diabetic Educator, soon: [phone].    Continue to eat a 15 gram carbohydrate snack (2-3 snacks per 24 hour day) at times of the day that you are likely to get low blood sugars.     When you do feel symptoms of low blood sugars, try to check your sugar, eat a snack and recheck your sugar 15 minutes later. That way, you will know 1) if you are really low or not 2) if the snack that you ate corrected your blood sugar, or was it too much or too little.     I would advise the next primary care visit in 2-3 months-please establish with a new primary care provider.           Follow-ups after your visit        Your next 10 appointments already scheduled     Oct 31, 2018  9:00 AM CDT   New Visit with Kiki Campos MD   [facility]    [address] [place] [id number] [phone]              Who to contact     If you have questions or need follow up information about today's clinic visit or your schedule please contact [facility] directly at [phone].  Normal or non-critical lab and imaging results will be communicated to you by MyChart, letter or phone within 4 business days after the clinic has received the results. If you do not hear from us within 7 days, please contact the clinic through MyChart or phone. If you have a critical or abnormal lab result, we will notify you by phone as soon as possible.  Submit refill requests through [employer] or call  your pharmacy and they will forward the refill request to us. Please allow 3 business days for your refill to be completed.          Additional Information About Your Visit        Care EveryWhere ID     This is your Care EveryWhere ID. This could be used by other organizations to access your [place] medical records  [id number]        Your Vitals Were     Pulse Temperature Respirations Pulse Oximetry BMI (Body Mass Index)       83 98.1  F (36.7  C) (Oral) 16 97% 23.48 kg/m2        Blood Pressure from Last 3 Encounters:   08/24/18 128/67   07/20/18 124/68   07/05/18 131/84    Weight from Last 3 Encounters:   08/24/18 154 lb (69.9 kg)   07/20/18 152 lb (68.9 kg)   07/05/18 147 lb (66.7 kg)              We Performed the Following     FOOT EXAM  NO CHARGE [[id number]]        Primary Care Provider Office Phone # Fax #    Amy Singh [phone] [address] [facility] [address]        Equal Access to Services     [facility]: Hadii aad ku hadasho Soomaali, waaxda luqadaha, qaybta kaalmada adeegyaakash, mikayla gan . So [facility] [phone].    ATENCIÓN: Si habla español, tiene a wagner disposición servicios gratuitos de asistencia lingüística. Llame al [phone].    We comply with applicable federal civil rights laws and [place] laws. We do not discriminate on the basis of race, color, national origin, age, disability, sex, sexual orientation, or gender identity.            Thank you!     Thank you for choosing [facility]  for your care. Our goal is always to provide you with excellent care. Hearing back from our patients is one way we can continue to improve our services. Please take a few minutes to complete the written survey that you may receive in the mail after your visit with us. Thank you!             Your Updated Medication List - Protect others around you: Learn how to safely use, store and throw away your medicines at  "www.disposemymeds.org.          This list is accurate as of 8/24/18  9:10 AM.  Always use your most recent med list.                   Brand Name Dispense Instructions for use Diagnosis    ACE/ARB/ARNI NOT PRESCRIBED (INTENTIONAL)      Please choose reason not prescribed, below    Type 1 diabetes mellitus without complication (H)       BASAGLAR 100 UNIT/ML injection     15 mL    29 units once SQ daily.  Open a new vial every 30 days.    Type 1 diabetes mellitus without complication (H)       blood glucose monitoring test strip    no brand specified    200 strip    Use to test blood sugar 2 times daily or as directed.Brand per insurance or patient    Uncontrolled type 1 diabetes mellitus without complication (H)       busPIRone 15 MG tablet    BUSPAR     Take 15 mg by mouth 3 times daily        FLUoxetine 20 MG capsule    PROzac     Take 60 mg by mouth daily        fluticasone 50 MCG/ACT spray    FLONASE     Spray 2 sprays in nostril 2 times daily as needed        glucagon 1 MG kit    GLUCAGON EMERGENCY    2 Kit    inject 1 mg as directed as needed.    Diabetes mellitus, type 1       insulin aspart 100 UNITS/ML injection    NovoLOG VIAL    15 mL    1 unit insulin subcutaneous, per 8 grams of carbohydrates typically. Max 100 units of this per day.    Type 1 diabetes mellitus without complication (H)       insulin syringe-needle U-100 31G X 5/16\" 0.5 ML    BD insulin syringe ultrafine    200 each    Use 5-7 syringes daily for insulin injections    Type I (juvenile type) diabetes mellitus without mention of complication, not stated as uncontrolled       montelukast 10 MG tablet    SINGULAIR     Take 10 mg by mouth daily          "

## 2018-08-24 NOTE — PATIENT INSTRUCTIONS
Continue your current Basaglar and Novolog.     Please call to schedule an appointment with the Diabetic Educator, soon: [phone].    Continue to eat a 15 gram carbohydrate snack (2-3 snacks per 24 hour day) at times of the day that you are likely to get low blood sugars.     When you do feel symptoms of low blood sugars, try to check your sugar, eat a snack and recheck your sugar 15 minutes later. That way, you will know 1) if you are really low or not 2) if the snack that you ate corrected your blood sugar, or was it too much or too little.     I would advise the next primary care visit in 2-3 months-please establish with a new primary care provider.

## 2018-08-28 DIAGNOSIS — E10.9 TYPE 1 DIABETES MELLITUS WITHOUT COMPLICATION (H): ICD-10-CM

## 2018-08-29 RX ORDER — INSULIN GLARGINE 100 [IU]/ML
INJECTION, SOLUTION SUBCUTANEOUS
Qty: 15 ML | Refills: 2 | Status: SHIPPED | OUTPATIENT
Start: 2018-08-29 | End: 2018-11-21

## 2018-08-29 NOTE — TELEPHONE ENCOUNTER
? Is medication to be addressed by PCP or Endo.    Patient has upcoming appointment with Endo provider 10/31/18  Was Basaglar dose adjusted at last office visit 8/24/18?   Please advise  Maryam Pizarro RN

## 2018-10-16 ENCOUNTER — PRE VISIT (OUTPATIENT)
Dept: ENDOCRINOLOGY | Facility: CLINIC | Age: 23
End: 2018-10-16

## 2018-10-16 NOTE — TELEPHONE ENCOUNTER
"PREVISIT INFORMATION                                                    Kain Varela scheduled for future visit at [facility].    Attempted to contact pt, VM not set up.  Lottie Stephens CMA     Patient is scheduled to see Dr Campos on 10/31/18  Reason for visit: Type 1 DM  Referring provider Unknown  Has patient seen previous specialist? No  Medical Records:  Available in chart.  Patient was previously seen at a [facility] or [facility] facility.    REVIEW                                                      New patient packet mailed to patient: No  Medication reconciliation complete: No      Current Outpatient Prescriptions   Medication Sig Dispense Refill     ACE/ARB/ARNI NOT PRESCRIBED, INTENTIONAL, Please choose reason not prescribed, below       BASAGLAR 100 UNIT/ML injection INJECT 29 UNITS ONCE SUBCUTANEOUSLY EVERY DAY. REPLACES LANTUS 15 mL 2     BASAGLAR 100 UNIT/ML injection 29 units once SQ daily.  Open a new vial every 30 days. 15 mL 0     blood glucose monitoring (NO BRAND SPECIFIED) test strip Use to test blood sugar 2 times daily or as directed.Brand per insurance or patient 200 strip 3     busPIRone (BUSPAR) 15 MG tablet Take 15 mg by mouth 3 times daily       FLUoxetine (PROZAC) 20 MG capsule Take 60 mg by mouth daily       fluticasone (FLONASE) 50 MCG/ACT spray Spray 2 sprays in nostril 2 times daily as needed       glucagon (GLUCAGON EMERGENCY) 1 MG KIT inject 1 mg as directed as needed. 2 Kit 1     insulin aspart (NOVOLOG VIAL) 100 UNITS/ML injection 1 unit insulin subcutaneous, per 8 grams of carbohydrates typically. Max 100 units of this per day. 15 mL 0     insulin syringe-needle U-100 (BD INSULIN SYRINGE ULTRAFINE) 31G X 5/16\" 0.5 ML MISC Use 5-7 syringes daily for insulin injections 200 each 11     montelukast (SINGULAIR) 10 MG tablet Take 10 mg by mouth daily         Allergies: Review of patient's allergies indicates no known " allergies.        PLAN/FOLLOW-UP NEEDED                                                      Will route to care team for follow-up.    Patient Reminders Given:  Please, make sure you bring an updated list of your medications.   If you are having a procedure, please, present 15 minutes early.  If you need to cancel or reschedule,please call [phone].    Lottie Stephens

## 2018-11-21 DIAGNOSIS — E10.9 TYPE 1 DIABETES MELLITUS WITHOUT COMPLICATION (H): ICD-10-CM

## 2018-11-21 RX ORDER — INSULIN GLARGINE 100 [IU]/ML
INJECTION, SOLUTION SUBCUTANEOUS
Qty: 15 ML | Refills: 2 | Status: SHIPPED | OUTPATIENT
Start: 2018-11-21 | End: 2019-01-18

## 2019-01-18 ENCOUNTER — OFFICE VISIT (OUTPATIENT)
Dept: ENDOCRINOLOGY | Facility: CLINIC | Age: 24
End: 2019-01-18
Payer: COMMERCIAL

## 2019-01-18 VITALS
WEIGHT: 154.6 LBS | SYSTOLIC BLOOD PRESSURE: 135 MMHG | HEART RATE: 83 BPM | DIASTOLIC BLOOD PRESSURE: 80 MMHG | HEIGHT: 68 IN | BODY MASS INDEX: 23.43 KG/M2 | OXYGEN SATURATION: 98 %

## 2019-01-18 DIAGNOSIS — Z83.3 FAMILY HISTORY OF DIABETES MELLITUS: ICD-10-CM

## 2019-01-18 DIAGNOSIS — E10.65 TYPE 1 DIABETES MELLITUS WITH HYPERGLYCEMIA (H): Primary | ICD-10-CM

## 2019-01-18 DIAGNOSIS — Z91.148 POOR COMPLIANCE WITH MEDICATION: ICD-10-CM

## 2019-01-18 DIAGNOSIS — Z83.49 FAMILY HISTORY OF LACTOSE INTOLERANCE: ICD-10-CM

## 2019-01-18 LAB — HBA1C MFR BLD: 12.4 % (ref 0–5.6)

## 2019-01-18 PROCEDURE — 36415 COLL VENOUS BLD VENIPUNCTURE: CPT | Performed by: INTERNAL MEDICINE

## 2019-01-18 PROCEDURE — 83036 HEMOGLOBIN GLYCOSYLATED A1C: CPT | Performed by: INTERNAL MEDICINE

## 2019-01-18 PROCEDURE — 99204 OFFICE O/P NEW MOD 45 MIN: CPT | Performed by: INTERNAL MEDICINE

## 2019-01-18 RX ORDER — FLASH GLUCOSE SCANNING READER
1 EACH MISCELLANEOUS CONTINUOUS
Qty: 1 DEVICE | Refills: 0 | Status: SHIPPED | OUTPATIENT
Start: 2019-01-18 | End: 2022-10-10

## 2019-01-18 RX ORDER — FLASH GLUCOSE SENSOR
1 KIT MISCELLANEOUS CONTINUOUS
Qty: 9 EACH | Refills: 3 | Status: SHIPPED | OUTPATIENT
Start: 2019-01-18 | End: 2022-10-10

## 2019-01-18 ASSESSMENT — MIFFLIN-ST. JEOR: SCORE: 1667.51

## 2019-01-18 NOTE — LETTER
2019         RE: Kain Varela  [address] [facility] [address]        Dear Colleague,    Thank you for referring your patient, Kain Varela, to the [facility]. Please see a copy of my visit note below.                                                                               - Endocrinology Initial Consultation -    Reason for visit/consult:  Type 1 diabetes mellitus without complication (H)    Primary care provider: Amy Singh    HPI: A 24 yo male here for the evaluation of type 1 diabetes.  Patient was diagnosed type 1 diabetes at age 6 with a symptom of significant weight loss dehydration polyuria polydipsia.  Patient has been on insulin since then never had DKA no hypoglycemic episode which required ambulance.  Patient switched to provider many times due to switching his parents insurance.   Last A1C 10.4 8 month agao.   He currently working in a factory as a night shift, if he forgets NovoLog twice a week, forget basilar approximately once a months.       Current Regimens:  Basaglar: 29 units at 8 am  Novolo unit: 8 g       Life Style:  Wake up 4-5 pm   Breakfast 6 -7 pm eggs toast, burrito (usually inject: 9 units)  Lunch  9 pm bar, organic dry ankur (not)  Dinner  1-2 am  Soup, meat (bigger meal)  (usually 12units)  At home: 3 eggs 8 am, then sleep (not)  Bedtime    Exercise:  Sometimes biking     DM complications:  Retinopathy: 2 years, need apotiemtn   Nephropathy:   Neuropathy: no  Most recent LDL:   LDL Cholesterol Calculated   Date Value Ref Range Status   2014 86 0 - 129 mg/dL Final     Comment:     LDL Cholesterol is the primary guide to therapy: LDL-cholesterol goal in high   risk patients is <100 mg/dL and in very high risk patients is <70 mg/dL.       LDL Cholesterol Direct   Date Value Ref Range Status   2018 94 <100 mg/dL Final     Comment:     Desirable:       <100 mg/dl       Past Medical/Surgical History:  Past  "Medical History:   Diagnosis Date     Type 1 diabetes mellitus (H)    anxiety depression  Past Surgical History:   Procedure Laterality Date     NO HISTORY OF SURGERY         Allergies:  No Known Allergies    Current Medications   Current Outpatient Medications   Medication     blood glucose monitoring (NO BRAND SPECIFIED) test strip     FLUoxetine (PROZAC) 20 MG capsule     fluticasone (FLONASE) 50 MCG/ACT spray     glucagon (GLUCAGON EMERGENCY) 1 MG KIT     insulin aspart (NOVOLOG VIAL) 100 UNITS/ML injection     insulin glargine (BASAGLAR KWIKPEN) 100 UNIT/ML pen     insulin syringe-needle U-100 (BD INSULIN SYRINGE ULTRAFINE) 31G X 5/16\" 0.5 ML MISC     montelukast (SINGULAIR) 10 MG tablet     ACE/ARB/ARNI NOT PRESCRIBED, INTENTIONAL,     BASAGLAR 100 UNIT/ML injection     No current facility-administered medications for this visit.        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     ROS:  Full review of systems taken with the help of the intake sheet. Otherwise a complete 14 point review of systems was taken and is negative unless stated in the history above.      Physical Exam:   Blood pressure 135/80, pulse 83, height 1.722 m (5' 7.8\"), weight 70.1 kg (154 lb 9.6 oz), SpO2 98 %.    General: well appearing, no acute distress, pleasant and conversant,   Mental Status/neuro: alert and oriented  Face: symmetrical, normal facial color  Eyes: anicteric, PERRL, no proptosis or lid lag  Neck: suppler, no lymphadenopahty  Thyroid: normal size and texture, no nodule palpable, no bruits  Heart: regular rhythm, S1S2, no murmur appreciated  Lung: clear to auscultation " bilaterally  Abdomen: soft, NT/ND, no hepatomegaly  Legs: no swelling or edema  Feet: no deformities or ulcers, 2+ DP pulses, normal monofilament sensation      Labs : I reviewed data from epic and extract and summarize the pertinent data here.   Lab Results   Component Value Date     06/22/2014      Lab Results   Component Value Date    POTASSIUM 3.9 06/22/2014     Lab Results   Component Value Date    CHLORIDE 106 06/22/2014     Lab Results   Component Value Date    STEFFI 9.8 06/22/2014     Lab Results   Component Value Date    CO2 23 06/22/2014     Lab Results   Component Value Date    BUN 9 06/22/2014     Lab Results   Component Value Date    CR 0.77 07/05/2018     Lab Results   Component Value Date     06/22/2014     Lab Results   Component Value Date    TSH 1.76 07/05/2018     Lab Results   Component Value Date    T4 1.04 09/02/2014     Lab Results   Component Value Date    A1C 12.4 01/18/2019             Glucose Log: We downloaded glucometer and analyzed  Here.  Not checking glucose at all for the past 2 weeks.           Assessment and Plan  23 year old male with DM1, irregular life style, elevated A1c,    # DM1  A1C 12.4 today, A1C persistently 2 digits at least past 4 years.   Compliance and irregular life style (night shift) is the limiting factor  -Continue current insulin regimen  (long acting 29 unit, Novolog 1:8)    -Patient requested and we will switch from [facility] to [facility] with same unit 29/day  -Continue current Humalog 1: 8 ratio twice daily injection    -We will try to add Jardiance 10 mg daily    # DM devices CMG  -Discussed continuous glucose monitoring device  -We will try chelly FreeStyle    # DM complications    Unit(s) micro albumin next visit  Lipid, TFTS next visit due.    # family history of lactose intolerance  Patient dose not have symptmos, will consider Celiac screening if he has GI symptmos.         -Return to clinic with me in 2 months to check        I spent 45 minutes  with this patient face to face and explained the conditions and plans (more than 50% of time was counseling/coordination of care) . The patient understood and is satisfied with today's visit. Return to clinic with me in 2 months.         Zonia Atkins MD  Staff Physician  Endocrinology and Metabolism  License: [id number]    Again, thank you for allowing me to participate in the care of your patient.        Sincerely,        Zonia Atkins MD

## 2019-01-18 NOTE — PROGRESS NOTES
- Endocrinology Initial Consultation -    Reason for visit/consult:  Type 1 diabetes mellitus without complication (H)    Primary care provider: Amy Singh    HPI: A 24 yo male here for the evaluation of type 1 diabetes.  Patient was diagnosed type 1 diabetes at age 6 with a symptom of significant weight loss dehydration polyuria polydipsia.  Patient has been on insulin since then never had DKA no hypoglycemic episode which required ambulance.  Patient switched to provider many times due to switching his parents insurance.   Last A1C 10.4 8 month agao.   He currently working in a factory as a night shift, if he forgets NovoLog twice a week, forget basilar approximately once a months.       Current Regimens:  Basaglar: 29 units at 8 am  Novolo unit: 8 g       Life Style:  Wake up 4-5 pm   Breakfast 6 -7 pm eggs toast, burrito (usually inject: 9 units)  Lunch  9 pm bar, organic dry ankur (not)  Dinner  1-2 am  Soup, meat (bigger meal)  (usually 12units)  At home: 3 eggs 8 am, then sleep (not)  Bedtime    Exercise:  Sometimes biking     DM complications:  Retinopathy: 2 years, need apotiemtn   Nephropathy:   Neuropathy: no  Most recent LDL:   LDL Cholesterol Calculated   Date Value Ref Range Status   2014 86 0 - 129 mg/dL Final     Comment:     LDL Cholesterol is the primary guide to therapy: LDL-cholesterol goal in high   risk patients is <100 mg/dL and in very high risk patients is <70 mg/dL.       LDL Cholesterol Direct   Date Value Ref Range Status   2018 94 <100 mg/dL Final     Comment:     Desirable:       <100 mg/dl       Past Medical/Surgical History:  Past Medical History:   Diagnosis Date     Type 1 diabetes mellitus (H)    anxiety depression  Past Surgical History:   Procedure Laterality Date     NO HISTORY OF SURGERY         Allergies:  No Known Allergies    Current Medications   Current Outpatient  "Medications   Medication     blood glucose monitoring (NO BRAND SPECIFIED) test strip     FLUoxetine (PROZAC) 20 MG capsule     fluticasone (FLONASE) 50 MCG/ACT spray     glucagon (GLUCAGON EMERGENCY) 1 MG KIT     insulin aspart (NOVOLOG VIAL) 100 UNITS/ML injection     insulin glargine (BASAGLAR KWIKPEN) 100 UNIT/ML pen     insulin syringe-needle U-100 (BD INSULIN SYRINGE ULTRAFINE) 31G X 5/16\" 0.5 ML MISC     montelukast (SINGULAIR) 10 MG tablet     ACE/ARB/ARNI NOT PRESCRIBED, INTENTIONAL,     BASAGLAR 100 UNIT/ML injection     No current facility-administered medications for this visit.        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     ROS:  Full review of systems taken with the help of the intake sheet. Otherwise a complete 14 point review of systems was taken and is negative unless stated in the history above.      Physical Exam:   Blood pressure 135/80, pulse 83, height 1.722 m (5' 7.8\"), weight 70.1 kg (154 lb 9.6 oz), SpO2 98 %.    General: well appearing, no acute distress, pleasant and conversant,   Mental Status/neuro: alert and oriented  Face: symmetrical, normal facial color  Eyes: anicteric, PERRL, no proptosis or lid lag  Neck: suppler, no lymphadenopahty  Thyroid: normal size and texture, no nodule palpable, no bruits  Heart: regular rhythm, S1S2, no murmur appreciated  Lung: clear to auscultation bilaterally  Abdomen: soft, NT/ND, no hepatomegaly  Legs: no swelling or edema  Feet: no deformities or ulcers, 2+ DP pulses, normal monofilament sensation      Labs : I reviewed data from epic and extract and summarize the pertinent data here.   Lab Results "   Component Value Date     06/22/2014      Lab Results   Component Value Date    POTASSIUM 3.9 06/22/2014     Lab Results   Component Value Date    CHLORIDE 106 06/22/2014     Lab Results   Component Value Date    STEFFI 9.8 06/22/2014     Lab Results   Component Value Date    CO2 23 06/22/2014     Lab Results   Component Value Date    BUN 9 06/22/2014     Lab Results   Component Value Date    CR 0.77 07/05/2018     Lab Results   Component Value Date     06/22/2014     Lab Results   Component Value Date    TSH 1.76 07/05/2018     Lab Results   Component Value Date    T4 1.04 09/02/2014     Lab Results   Component Value Date    A1C 12.4 01/18/2019             Glucose Log: We downloaded glucometer and analyzed  Here.  Not checking glucose at all for the past 2 weeks.           Assessment and Plan  23 year old male with DM1, irregular life style, elevated A1c,    # DM1  A1C 12.4 today, A1C persistently 2 digits at least past 4 years.   Compliance and irregular life style (night shift) is the limiting factor  -Continue current insulin regimen  (long acting 29 unit, Novolog 1:8)    -Patient requested and we will switch from basilar to [facility] with same unit 29/day  -Continue current Humalog 1: 8 ratio twice daily injection    -We will try to add Jardiance 10 mg daily    # DM devices CMG  -Discussed continuous glucose monitoring device  -We will try chelly FreeStyle    # DM complications    Unit(s) micro albumin next visit  Lipid, TFTS next visit due.    # family history of lactose intolerance  Patient dose not have symptmos, will consider Celiac screening if he has GI symptmos.         -Return to clinic with me in 2 months to check        I spent 45 minutes with this patient face to face and explained the conditions and plans (more than 50% of time was counseling/coordination of care) . The patient understood and is satisfied with today's visit. Return to clinic with me in 2 months.         Zonia Atkins,  MD  Staff Physician  Endocrinology and Metabolism  License: [id number]

## 2019-01-18 NOTE — NURSING NOTE
"Kain Varela's goals for this visit include: DM   He requests these members of his care team be copied on today's visit information: Yes    PCP: Amy Singh    Referring Provider:  Referred Self, MD  No address on file    /80 (BP Location: Left arm, Patient Position: Chair, Cuff Size: Adult Regular)   Pulse 83   Ht 1.722 m (5' 7.8\")   Wt 70.1 kg (154 lb 9.6 oz)   SpO2 98%   BMI 23.65 kg/m      Do you need any medication refills at today's visit? Yes, would like tresiba instead of long acting insulin covered better under insurance    "

## 2019-02-14 ENCOUNTER — TRANSFERRED RECORDS (OUTPATIENT)
Dept: HEALTH INFORMATION MANAGEMENT | Facility: CLINIC | Age: 24
End: 2019-02-14

## 2019-03-15 ENCOUNTER — OFFICE VISIT (OUTPATIENT)
Dept: ENDOCRINOLOGY | Facility: CLINIC | Age: 24
End: 2019-03-15
Payer: COMMERCIAL

## 2019-03-15 VITALS
BODY MASS INDEX: 23.73 KG/M2 | OXYGEN SATURATION: 97 % | DIASTOLIC BLOOD PRESSURE: 76 MMHG | WEIGHT: 155.1 LBS | SYSTOLIC BLOOD PRESSURE: 110 MMHG | HEART RATE: 99 BPM

## 2019-03-15 DIAGNOSIS — E10.65 TYPE 1 DIABETES MELLITUS WITH HYPERGLYCEMIA (H): Primary | ICD-10-CM

## 2019-03-15 DIAGNOSIS — Z91.148 POOR COMPLIANCE WITH MEDICATION: ICD-10-CM

## 2019-03-15 DIAGNOSIS — E10.649 TYPE 1 DIABETES MELLITUS WITH HYPOGLYCEMIA AND WITHOUT COMA (H): ICD-10-CM

## 2019-03-15 PROCEDURE — 95251 CONT GLUC MNTR ANALYSIS I&R: CPT | Performed by: INTERNAL MEDICINE

## 2019-03-15 PROCEDURE — 99214 OFFICE O/P EST MOD 30 MIN: CPT | Performed by: INTERNAL MEDICINE

## 2019-03-15 NOTE — NURSING NOTE
Kain Varela's goals for this visit include:   Chief Complaint   Patient presents with     Diabetes     He requests these members of his care team be copied on today's visit information: No    PCP: No Ref-Primary, Physician    Referring Provider:  No referring provider defined for this encounter.    /76 (BP Location: Left arm, Patient Position: Sitting, Cuff Size: Adult Regular)   Pulse 99   Wt 70.4 kg (155 lb 1.6 oz)   SpO2 97%   BMI 23.73 kg/m      Do you need any medication refills at today's visit? Yes

## 2019-03-15 NOTE — PATIENT INSTRUCTIONS
[facility]  Ghazal Grant RN, Diabetes Educator: [phone]  Clinic Nurses Yvette Vasquez: [phone]  Clinic Fax: [phone]  On-Call Endocrine at [facility] (after hours/weekends): [phone] option 4  Scheduling Line: [phone]    Appointment Reminders:  * Please bring meter with for staff to download  * If you are due ONLY for an A1C, it is scheduled with the nurse and will be done in clinic. You do not need to schedule a lab appointment. Fasting is not required for an A1C.  * Refill request should be submitted to your pharmacy. They will contact clinic for approval.

## 2019-03-15 NOTE — LETTER
3/15/2019         RE: Kain Varela  [address] [facility] [place] [address]        Dear Colleague,    Thank you for referring your patient, Kain Varela, to the [facility]. Please see a copy of my visit note below.                                                                               - Endocrinology Follow up -    Reason for visit/consult:  Type 1 diabetes mellitus without complication (H)    Primary care provider: Amy Singh    Assessment and Plan  23 year old male with DM1, irregular life style, elevated A1c,    # DM1  Last  A1C 12.4. persistently 2 digits at least past 4 years.   Started to jourdan freestyle continuous glucose monitoring found out his correction was too high (he was doing more than 150, 1 unit/30 mg/dl), he had a few episodes of glucose 500 then overcorrected went to hypoglycemia.   Discussed with patient we will start new correction.    - Correction changed.  Glucose more than 200 mg/dl  Correct 1 units per 50 mg/dl    - Make sure to take Jardiance 10 mg daily    - Continue Tresiba 29 units    - Continue Novolog 1 unit: 8 gram carb      # DM devices CMG  Jourdan FreeStyle has been provoigin good information to analyze, we will continue.    * there is a episode 3/7 midnight, , asymptomatic, he checked glucose by finger stick showed 167. May have connenction or attachment issue of device?? I will address to Ghazal.     #Lifestyle - night shift  Patient will switch complete the night shift from 6 PM to 6 AM from next week.  We will observe the pattern of glucose by this lifestyle and will be assessed next visit by Ghazal.    # DM complications    Unit(s) micro albumin next visit  Lipid, TFTS , CMP next visit due.    # family history of lactose intolerance  Patient dose not have symptmos, will consider Celiac screening if he has GI symptmos.         -Return to clinic with Ghazal in 1 months to check, me in July 2019.        I spent 30 minutes  with this patient face to face and explained the conditions and plans (more than 50% of time was counseling/coordination of care) . The patient understood and is satisfied with today's visit. Return to clinic with Ghazal in 1 month.         Zonia Atkins MD  Staff Physician  Endocrinology and Metabolism  License: [id number]    Interval History: Started to chelly freestyle continuous glucose monitoring found out his correction was too high, he had a few episodes of glucose 500 then overcorrected went to hypoglycemia.   Discussed with patient we will start new correction.    HPI: A 24 yo male here for the evaluation of type 1 diabetes.  Patient was diagnosed type 1 diabetes at age 6 with a symptom of significant weight loss dehydration polyuria polydipsia.  Patient has been on insulin since then never had DKA no hypoglycemic episode which required ambulance.  Patient switched to provider many times due to switching his parents insurance.   Last A1C 10.4 8 month agao.   He currently working in a factory as a night shift, if he forgets NovoLog twice a week, forget basilar approximately once a months.       Current Regimens:  Basaglar: 29 units at 8 am  Novolo unit: 8 g   Joardiance 10 mg daily  Glucose more than 200 mg/dl  Correct 1 units per 50 mg/dl  CGM - chelly free style    Life Style:  Wake up 4-5 pm   Breakfast 6 -7 pm eggs toast, burrito (usually inject: 9 units)  Lunch  9 pm bar, organic dry ankur (not)  Dinner  1-2 am  Soup, meat (bigger meal)  (usually 12units)  At home: 3 eggs 8 am, then sleep (not)  Bedtime    Exercise:  Sometimes biking     DM complications:  Retinopathy: 2 years, need apotiemtn   Nephropathy:   Neuropathy: no  Most recent LDL:   LDL Cholesterol Calculated   Date Value Ref Range Status   2014 86 0 - 129 mg/dL Final     Comment:     LDL Cholesterol is the primary guide to therapy: LDL-cholesterol goal in high   risk patients is <100 mg/dL and in very high risk patients is <70 mg/dL.    "    LDL Cholesterol Direct   Date Value Ref Range Status   07/05/2018 94 <100 mg/dL Final     Comment:     Desirable:       <100 mg/dl     Continuous glucose monitoring by delivery FreeStyle: We downloaded his continuous glucose monitoring and analyzed and evaluate summarized here.  Average glucose 293 +_ 152.      nighttime hypoglycemia 300-500 often seen, during his night shift.  During the day there is rapid to drop from 500-58 in few hours, he was overcorrected 12 units.  Also noted some discrepancy was glucose and CGM ( his glucose 167).      Past Medical/Surgical History:  Past Medical History:   Diagnosis Date     Type 1 diabetes mellitus (H)    anxiety depression  Past Surgical History:   Procedure Laterality Date     NO HISTORY OF SURGERY         Allergies:  No Known Allergies    Current Medications   Current Outpatient Medications   Medication     blood glucose monitoring (NO BRAND SPECIFIED) test strip     busPIRone (BUSPAR) 15 MG tablet     Continuous Blood Gluc  (FREESTYLE CADEN 14 DAY READER) MERCEDES     Continuous Blood Gluc Sensor (FREESTYLE CADEN 14 DAY SENSOR) MISC     empagliflozin (JARDIANCE) 10 MG TABS tablet     FLUoxetine (PROZAC) 20 MG capsule     fluticasone (FLONASE) 50 MCG/ACT spray     insulin aspart (NOVOLOG VIAL) 100 UNITS/ML injection     insulin degludec (TRESIBA FLEXTOUCH) 100 UNIT/ML pen     insulin syringe-needle U-100 (BD INSULIN SYRINGE ULTRAFINE) 31G X 5/16\" 0.5 ML MISC     montelukast (SINGULAIR) 10 MG tablet     ACE/ARB/ARNI NOT PRESCRIBED, INTENTIONAL,     BASAGLAR 100 UNIT/ML injection     glucagon (GLUCAGON EMERGENCY) 1 MG KIT     No current facility-administered medications for this visit.        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     ROS:  Full review of systems taken with the help of the intake sheet. Otherwise a complete 14 point review of systems was taken and is negative unless stated in the history above.      Physical Exam:   Blood pressure 110/76, pulse 99, weight 70.4 kg (155 lb 1.6 oz), SpO2 97 %.    General: well appearing, no acute distress, pleasant and conversant,   Mental Status/neuro: alert and oriented  Face: symmetrical, normal facial color  Eyes: anicteric, PERRL, no proptosis or lid lag  Neck: suppler, no lymphadenopahty  Thyroid: normal size and texture, no nodule palpable, no bruits  Heart: regular rhythm, S1S2, no murmur appreciated  Lung: clear to auscultation bilaterally  Abdomen: soft, NT/ND, no hepatomegaly  Legs: no swelling or edema  Feet: no deformities or ulcers, 2+ DP pulses, normal monofilament sensation      Labs : I reviewed data from epic and extract and summarize the pertinent data here.   Lab Results   Component Value Date     06/22/2014      Lab Results   Component Value Date    POTASSIUM 3.9 06/22/2014     Lab Results   Component Value Date    CHLORIDE 106 06/22/2014     Lab Results   Component Value Date    STEFFI 9.8 06/22/2014     Lab Results   Component Value Date    CO2 23 06/22/2014     Lab Results   Component Value Date    BUN 9 06/22/2014     Lab Results   Component Value Date    CR 0.77 07/05/2018     Lab Results   Component Value Date     06/22/2014     Lab Results   Component Value Date    TSH 1.76 07/05/2018     Lab Results   Component Value Date    T4 1.04 09/02/2014     Lab Results   Component Value Date    A1C 12.4 01/18/2019             Glucose Log: We downloaded glucometer and analyzed  Here.  Not checking glucose at all for the past 2 weeks.           Again, thank you for allowing me to participate in the care of your patient.         Sincerely,        Zonia Atkins MD

## 2019-03-15 NOTE — PROGRESS NOTES
- Endocrinology Follow up -    Reason for visit/consult:  Type 1 diabetes mellitus without complication (H)    Primary care provider: Amy Singh    Assessment and Plan  23 year old male with DM1, irregular life style, elevated A1c,    # DM1  Last  A1C 12.4. persistently 2 digits at least past 4 years.   Started to jourdan freestyle continuous glucose monitoring found out his correction was too high (he was doing more than 150, 1 unit/30 mg/dl), he had a few episodes of glucose 500 then overcorrected went to hypoglycemia.   Discussed with patient we will start new correction.    - Correction changed.  Glucose more than 200 mg/dl  Correct 1 units per 50 mg/dl    - Make sure to take Jardiance 10 mg daily    - Continue Tresiba 29 units    - Continue Novolog 1 unit: 8 gram carb      # DM devices CMG  Jourdan FreeStyle has been provoigin good information to analyze, we will continue.    * there is a episode 3/7 midnight, , asymptomatic, he checked glucose by finger stick showed 167. May have connenction or attachment issue of device?? I will address to Ghazal.     #Lifestyle - night shift  Patient will switch complete the night shift from 6 PM to 6 AM from next week.  We will observe the pattern of glucose by this lifestyle and will be assessed next visit by Ghazal.    # DM complications    Unit(s) micro albumin next visit  Lipid, TFTS , CMP next visit due.    # family history of lactose intolerance  Patient dose not have symptmos, will consider Celiac screening if he has GI symptmos.         -Return to clinic with Ghazal in 1 months to check, me in July 2019.        I spent 30 minutes with this patient face to face and explained the conditions and plans (more than 50% of time was counseling/coordination of care) . The patient understood and is satisfied with today's visit. Return to clinic with Ghazal in 1 month.         Zonia  MD Wilbert  Staff Physician  Endocrinology and Metabolism  License: [id number]    Interval History: Started to chelly freestyle continuous glucose monitoring found out his correction was too high, he had a few episodes of glucose 500 then overcorrected went to hypoglycemia.   Discussed with patient we will start new correction.    HPI: A 24 yo male here for the evaluation of type 1 diabetes.  Patient was diagnosed type 1 diabetes at age 6 with a symptom of significant weight loss dehydration polyuria polydipsia.  Patient has been on insulin since then never had DKA no hypoglycemic episode which required ambulance.  Patient switched to provider many times due to switching his parents insurance.   Last A1C 10.4 8 month agao.   He currently working in a factory as a night shift, if he forgets NovoLog twice a week, forget basilar approximately once a months.       Current Regimens:  Basaglar: 29 units at 8 am  Novolo unit: 8 g   Joardiance 10 mg daily  Glucose more than 200 mg/dl  Correct 1 units per 50 mg/dl  CGM - chelly free style    Life Style:  Wake up 4-5 pm   Breakfast 6 -7 pm eggs toast, burrito (usually inject: 9 units)  Lunch  9 pm bar, organic dry ankur (not)  Dinner  1-2 am  Soup, meat (bigger meal)  (usually 12units)  At home: 3 eggs 8 am, then sleep (not)  Bedtime    Exercise:  Sometimes biking     DM complications:  Retinopathy: 2 years, need apotiemtn   Nephropathy:   Neuropathy: no  Most recent LDL:   LDL Cholesterol Calculated   Date Value Ref Range Status   2014 86 0 - 129 mg/dL Final     Comment:     LDL Cholesterol is the primary guide to therapy: LDL-cholesterol goal in high   risk patients is <100 mg/dL and in very high risk patients is <70 mg/dL.       LDL Cholesterol Direct   Date Value Ref Range Status   2018 94 <100 mg/dL Final     Comment:     Desirable:       <100 mg/dl     Continuous glucose monitoring by delivery FreeStyle: We downloaded his continuous glucose monitoring and  "analyzed and evaluate summarized here.  Average glucose 293 +_ 152.      nighttime hypoglycemia 300-500 often seen, during his night shift.  During the day there is rapid to drop from 500-58 in few hours, he was overcorrected 12 units.  Also noted some discrepancy was glucose and CGM ( his glucose 167).      Past Medical/Surgical History:  Past Medical History:   Diagnosis Date     Type 1 diabetes mellitus (H)    anxiety depression  Past Surgical History:   Procedure Laterality Date     NO HISTORY OF SURGERY         Allergies:  No Known Allergies    Current Medications   Current Outpatient Medications   Medication     blood glucose monitoring (NO BRAND SPECIFIED) test strip     busPIRone (BUSPAR) 15 MG tablet     Continuous Blood Gluc  (FREESTYLE CADEN 14 DAY READER) MERCEDES     Continuous Blood Gluc Sensor (FREESTYLE CADEN 14 DAY SENSOR) MISC     empagliflozin (JARDIANCE) 10 MG TABS tablet     FLUoxetine (PROZAC) 20 MG capsule     fluticasone (FLONASE) 50 MCG/ACT spray     insulin aspart (NOVOLOG VIAL) 100 UNITS/ML injection     insulin degludec (TRESIBA FLEXTOUCH) 100 UNIT/ML pen     insulin syringe-needle U-100 (BD INSULIN SYRINGE ULTRAFINE) 31G X 5/16\" 0.5 ML MISC     montelukast (SINGULAIR) 10 MG tablet     ACE/ARB/ARNI NOT PRESCRIBED, INTENTIONAL,     BASAGLAR 100 UNIT/ML injection     glucagon (GLUCAGON EMERGENCY) 1 MG KIT     No current facility-administered medications for this visit.        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     ROS:  Full review of systems taken with the help of the intake sheet. Otherwise a complete 14 point review of systems was " taken and is negative unless stated in the history above.      Physical Exam:   Blood pressure 110/76, pulse 99, weight 70.4 kg (155 lb 1.6 oz), SpO2 97 %.    General: well appearing, no acute distress, pleasant and conversant,   Mental Status/neuro: alert and oriented  Face: symmetrical, normal facial color  Eyes: anicteric, PERRL, no proptosis or lid lag  Neck: suppler, no lymphadenopahty  Thyroid: normal size and texture, no nodule palpable, no bruits  Heart: regular rhythm, S1S2, no murmur appreciated  Lung: clear to auscultation bilaterally  Abdomen: soft, NT/ND, no hepatomegaly  Legs: no swelling or edema  Feet: no deformities or ulcers, 2+ DP pulses, normal monofilament sensation      Labs : I reviewed data from epic and extract and summarize the pertinent data here.   Lab Results   Component Value Date     06/22/2014      Lab Results   Component Value Date    POTASSIUM 3.9 06/22/2014     Lab Results   Component Value Date    CHLORIDE 106 06/22/2014     Lab Results   Component Value Date    STEFFI 9.8 06/22/2014     Lab Results   Component Value Date    CO2 23 06/22/2014     Lab Results   Component Value Date    BUN 9 06/22/2014     Lab Results   Component Value Date    CR 0.77 07/05/2018     Lab Results   Component Value Date     06/22/2014     Lab Results   Component Value Date    TSH 1.76 07/05/2018     Lab Results   Component Value Date    T4 1.04 09/02/2014     Lab Results   Component Value Date    A1C 12.4 01/18/2019             Glucose Log: We downloaded glucometer and analyzed  Here.  Not checking glucose at all for the past 2 weeks.

## 2019-04-16 NOTE — TELEPHONE ENCOUNTER
Faxed refill request received from [facility].   Medication Requested: Novolog 100unit/ml vial  Directions: 1 unit insulin subcutaneous per 8 grams of carbohydrates  Typically, max 100 units of this per day  Quantity: 10ml  Last Office Visit: 3/15/19  Next Appointment Scheduled for: JACQUELINE

## 2019-06-14 ENCOUNTER — TELEPHONE (OUTPATIENT)
Dept: ENDOCRINOLOGY | Facility: CLINIC | Age: 24
End: 2019-06-14

## 2019-06-14 DIAGNOSIS — E10.9 TYPE 1 DIABETES MELLITUS WITHOUT COMPLICATION (H): ICD-10-CM

## 2019-06-14 DIAGNOSIS — E10.65 TYPE 1 DIABETES MELLITUS WITH HYPERGLYCEMIA (H): ICD-10-CM

## 2019-06-14 NOTE — TELEPHONE ENCOUNTER
Health Call Center    Phone Message    May a detailed message be left on voicemail: yes    Reason for Call: Medication Question or concern regarding medication   Prescription Clarification  Name of Medication: insulin degludec (TRESIBA FLEXTOUCH) 100 UNIT/ML pen [[id number]] (Order [id number])   insulin aspart (NOVOLOG VIAL) 100 UNITS/ML vial [[id number]] (Order [id number]    Prescribing Provider:Wilbert    Pharmacy: [facility]   What on the order needs clarification? Patient is changing insurance on 05/17/2019 and would like a call back to discuss with provider changing insulin. Please advise.           Action Taken: Message routed to:  Adult Clinics: Endocrinology p [phone]

## 2019-06-17 RX ORDER — INSULIN GLARGINE 100 [IU]/ML
INJECTION, SOLUTION SUBCUTANEOUS
Qty: 30 ML | Refills: 2 | Status: SHIPPED | OUTPATIENT
Start: 2019-06-17 | End: 2020-11-30

## 2019-06-17 NOTE — TELEPHONE ENCOUNTER
Writer contacted patient to review. He now has [employer] insurance. Preferred insulin is Basaglar and Humalog. Patient prefers Humalog vial.    Prescriptions completed to pharmacy.    Christina Christianson LPN  Diabetes Clinic Coordinator   Adult Endocrinology and Pediatric Specialty Clinics  [facility]

## 2019-09-18 ENCOUNTER — TELEPHONE (OUTPATIENT)
Dept: ENDOCRINOLOGY | Facility: CLINIC | Age: 24
End: 2019-09-18

## 2019-09-18 DIAGNOSIS — E10.9 TYPE 1 DIABETES MELLITUS (H): Primary | ICD-10-CM

## 2019-09-18 NOTE — TELEPHONE ENCOUNTER
[facility] is requesting a new prescription for Pen needles. Patient told the pharmacy that he is out of them. Writer reviewed medication list and did not see an order for pen needles. Please advise and send new prescription.    Krystyna Yuen [facility]  Adult Endocrinology  [facility]

## 2020-03-20 DIAGNOSIS — E10.9 TYPE 1 DIABETES MELLITUS WITHOUT COMPLICATION (H): ICD-10-CM

## 2020-03-20 NOTE — TELEPHONE ENCOUNTER
Will forward to ADRI Prescott to review.    Last office visit: 3/15/19 with no future appt scheduled.    Christina Christianson LPN  Diabetes Clinic Coordinator   Adult Endocrinology and Pediatric Specialty Clinics  [facility]

## 2020-03-20 NOTE — TELEPHONE ENCOUNTER
HUMALOG 100 UNIT/ML VIAL   Last Written Prescription Date:  6/17/2019  Last Fill Quantity: 30,   # refills: 2  Last Office Visit : 3/15/2019  Future Office visit:  None    Routing refill request to provider for review/approval because:  Drug not on the [facility], [facility] or [facility] refill protocol or controlled substance    Kiki Vallejo RN  Central Triage Red Flags/Med Refills

## 2020-09-04 DIAGNOSIS — E10.9 TYPE 1 DIABETES MELLITUS (H): ICD-10-CM

## 2020-09-04 NOTE — TELEPHONE ENCOUNTER
Long Acting Insulin Protocol [id number]/04/2020 02:35 PM   Serum creatinine on file in past 12 months Protocol Details    HgbA1C in past 3 or 6 months     Recent (6 mo) or future (30 days) visit within the authorizing provider's specialty      Lab orders placed. Pt notified top schedule.

## 2020-11-27 DIAGNOSIS — E10.9 TYPE 1 DIABETES MELLITUS (H): ICD-10-CM

## 2020-11-28 NOTE — TELEPHONE ENCOUNTER
" insulin glargine (LANTUS SOLOSTAR) 100 UNIT/ML pen    Last Written Prescription Date: 9/4/20  Last Fill Quantity: 30ml,   # refills: 0  Last Office Visit :3/15/19  MG Atkins  Future Office visit:  None    \" Return to clinic with Ghazal in 1 months to check, me in July 2019.\"    Scheduling has been notified to contact the pt for appointment.    Routing refill request to provider for review/approval because: endo triage to fill.  "

## 2021-01-08 ENCOUNTER — VIRTUAL VISIT (OUTPATIENT)
Dept: ENDOCRINOLOGY | Facility: CLINIC | Age: 26
End: 2021-01-08
Payer: COMMERCIAL

## 2021-01-08 DIAGNOSIS — E10.65 TYPE 1 DIABETES MELLITUS WITH HYPERGLYCEMIA (H): Primary | ICD-10-CM

## 2021-01-08 DIAGNOSIS — Z91.148 POOR COMPLIANCE WITH MEDICATION: ICD-10-CM

## 2021-01-08 PROCEDURE — 99215 OFFICE O/P EST HI 40 MIN: CPT | Mod: 95 | Performed by: INTERNAL MEDICINE

## 2021-01-08 RX ORDER — FINASTERIDE 1 MG/1
1 TABLET, FILM COATED ORAL DAILY
COMMUNITY

## 2021-01-08 NOTE — PROGRESS NOTES
Kain Varela is a 25 year old female who is being evaluated via a billable video visit.       How would you like to obtain your AVS? MyChart  If the video visit is dropped, the invitation should be resent by: Text to cell phone: cells  Will anyone else be joining your video visit? No    Subjective     Kain is a 25 year old who presents to clinic today for the following health issues DM1    HPI : see below      Review of Systems   See below      Objective     see below      Vitals:  No vitals were obtained today due to virtual visit.    Physical Exam   See below    Total encounter time 45 minutes                                                                               - Endocrinology Follow up -    Reason for visit/consult:  Type 1 diabetes mellitus without complication (H)    Primary care provider: Amy Singh    Assessment and Plan  25 year old male with DM1, irregular life style, elevated A1c,    # DM1  Last  A1C 12.4 1 year ago. persistently 2 digits at least past 5 years  Doing 12 hours night shift. Not checking glucose, not taking jardiance.     - prescribed new glucometer    - Resume Jardiance 10 mg daily    - Continue Lantus 29 units    - Continue Novolog 1 unit: 8 gram carb  -Correction   Glucose more than 200 mg/dl  Correct 1 units per 50 mg/dl     # Compliance and motivation  We discussed today to encourage to pay more attention for his DM and takes care of his own health.     # DM devices CMG  We will stop for now, since patient feels this may not work for his current lots physical job.       #Lifestyle - night shift  Patient will switch complete the night shift from 6 PM to 6 AM from next week.      # DM complications    Unit(s) micro albumin ordered  A1C Lipid, TFTS , CMP next visit due.    # family history of lactose intolerance  Patient dose not have symptmos, will consider Celiac screening if he has GI symptmos.         -Return to clinic with Ghazal in 1 months to check, me in  2019.        Zonia Atkins MD  Staff Physician  Endocrinology and Metabolism  License: [id number]    Interval History as of 2021 : Patient has been doing well. Last  A1C 12.4 1 year ago. persistently 2 digits at least past 4 years  Doing 12 hours night shift. Not checking glucose, not taking jardiance.   Interval History: Started to chelly freestyle continuous glucose monitoring found out his correction was too high, he had a few episodes of glucose 500 then overcorrected went to hypoglycemia.   Discussed with patient we will start new correction.    HPI: A 22 yo male here for the evaluation of type 1 diabetes.  Patient was diagnosed type 1 diabetes at age 6 with a symptom of significant weight loss dehydration polyuria polydipsia.  Patient has been on insulin since then never had DKA no hypoglycemic episode which required ambulance.  Patient switched to provider many times due to switching his parents insurance.   Last A1C 10.4 8 month agao.   He currently working in a factory as a night shift, if he forgets NovoLog twice a week, forget basilar approximately once a months.       Current Regimens:  Basaglar: 29 units at 8 am  Novolo unit: 8 g   Joardiance 10 mg daily  Glucose more than 200 mg/dl  Correct 1 units per 50 mg/dl  CGM - chelly free style    Life Style:  Wake up 4-5 pm   Breakfast 6 -7 pm eggs toast, burrito (usually inject: 9 units)  Lunch  9 pm bar, organic dry ankur (not)  Dinner  1-2 am  Soup, meat (bigger meal)  (usually 12units)  At home: 3 eggs 8 am, then sleep (not)  Bedtime    Exercise:  Sometimes biking     DM complications:  Retinopathy: 2 years, need apotiemtn   Nephropathy:   Neuropathy: no  Most recent LDL:   LDL Cholesterol Calculated   Date Value Ref Range Status   2014 86 0 - 129 mg/dL Final     Comment:     LDL Cholesterol is the primary guide to therapy: LDL-cholesterol goal in high   risk patients is <100 mg/dL and in very high risk patients is <70 mg/dL.       LDL  "Cholesterol Direct   Date Value Ref Range Status   07/05/2018 94 <100 mg/dL Final     Comment:     Desirable:       <100 mg/dl     Continuous glucose monitoring: not available today, he is not checking        Past Medical/Surgical History:  Past Medical History:   Diagnosis Date     Type 1 diabetes mellitus (H)    anxiety depression  Past Surgical History:   Procedure Laterality Date     NO HISTORY OF SURGERY         Allergies:  No Known Allergies    Current Medications   Current Outpatient Medications   Medication     Biotin 2500 MCG CAPS     finasteride (PROPECIA) 1 MG tablet     insulin glargine (LANTUS SOLOSTAR) 100 UNIT/ML pen     insulin lispro (HUMALOG) 100 UNIT/ML vial     insulin pen needle (32G X 4 MM) 32G X 4 MM miscellaneous     insulin syringe-needle U-100 (BD INSULIN SYRINGE ULTRAFINE) 31G X 5/16\" 0.5 ML MISC     montelukast (SINGULAIR) 10 MG tablet     ACE/ARB/ARNI NOT PRESCRIBED, INTENTIONAL,     blood glucose monitoring (NO BRAND SPECIFIED) test strip     Continuous Blood Gluc  (FREESTYLE CADEN 14 DAY READER) MERCEDES     Continuous Blood Gluc Sensor (FREESTYLE CADEN 14 DAY SENSOR) MISC     FLUoxetine (PROZAC) 20 MG capsule     fluticasone (FLONASE) 50 MCG/ACT spray     glucagon (GLUCAGON EMERGENCY) 1 MG KIT     No current facility-administered medications for this visit.        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     ROS:  Full review of systems taken with the help of the intake sheet. Otherwise a complete 14 point review of systems was taken and is negative unless stated in the history above.      Physical Exam:   There were no vitals taken for this " visit.    General: well appearing, no acute distress, pleasant and conversant,   Mental Status/neuro: alert and oriented  Face: symmetrical, normal facial color  Eyes: anicteric, PERRL, no proptosis or lid lag  Neck: suppler, no lymphadenopahty  Thyroid: normal size and texture, no nodule palpable, no bruits  Heart: regular rhythm, S1S2, no murmur appreciated  Lung: clear to auscultation bilaterally  Abdomen: soft, NT/ND, no hepatomegaly  Legs: no swelling or edema  Feet: no deformities or ulcers, 2+ DP pulses, normal monofilament sensation      Labs : I reviewed data from epic and extract and summarize the pertinent data here.   Lab Results   Component Value Date     06/22/2014      Lab Results   Component Value Date    POTASSIUM 3.9 06/22/2014     Lab Results   Component Value Date    CHLORIDE 106 06/22/2014     Lab Results   Component Value Date    STEFFI 9.8 06/22/2014     Lab Results   Component Value Date    CO2 23 06/22/2014     Lab Results   Component Value Date    BUN 9 06/22/2014     Lab Results   Component Value Date    CR 0.77 07/05/2018     Lab Results   Component Value Date     06/22/2014     Lab Results   Component Value Date    TSH 1.76 07/05/2018     Lab Results   Component Value Date    T4 1.04 09/02/2014     Lab Results   Component Value Date    A1C 12.4 01/18/2019

## 2021-01-08 NOTE — LETTER
1/8/2021         RE: Kain Varela  [address]  [place] [address]        Dear Colleague,    Thank you for referring your patient, Kain Varela, to the [facility]. Please see a copy of my visit note below.    Kain Varela is a 25 year old female who is being evaluated via a billable video visit.       How would you like to obtain your AVS? MyChart  If the video visit is dropped, the invitation should be resent by: Text to cell phone: cells  Will anyone else be joining your video visit? No    Subjective     Kain is a 25 year old who presents to clinic today for the following health issues DM1    HPI : see below      Review of Systems   See below      Objective     see below      Vitals:  No vitals were obtained today due to virtual visit.    Physical Exam   See below    Total encounter time 45 minutes                                                                               - Endocrinology Follow up -    Reason for visit/consult:  Type 1 diabetes mellitus without complication (H)    Primary care provider: Amy Singh    Assessment and Plan  25 year old male with DM1, irregular life style, elevated A1c,    # DM1  Last  A1C 12.4 1 year ago. persistently 2 digits at least past 5 years  Doing 12 hours night shift. Not checking glucose, not taking jardiance.     - prescribed new glucometer    - Resume Jardiance 10 mg daily    - Continue Lantus 29 units    - Continue Novolog 1 unit: 8 gram carb  -Correction   Glucose more than 200 mg/dl  Correct 1 units per 50 mg/dl     # Compliance and motivation  We discussed today to encourage to pay more attention for his DM and takes care of his own health.     # DM devices CMG  We will stop for now, since patient feels this may not work for his current lots physical job.       #Lifestyle - night shift  Patient will switch complete the night shift from 6 PM to 6 AM from next week.      # DM complications    Unit(s)  micro albumin ordered  A1C Lipid, TFTS , CMP next visit due.    # family history of lactose intolerance  Patient dose not have symptmos, will consider Celiac screening if he has GI symptmos.         -Return to clinic with Ghazal in 1 months to check, me in 2019.        Zonia Atkins MD  Staff Physician  Endocrinology and Metabolism  License: [id number]    Interval History as of 2021 : Patient has been doing well. Last  A1C 12.4 1 year ago. persistently 2 digits at least past 4 years  Doing 12 hours night shift. Not checking glucose, not taking jardiance.   Interval History: Started to chelly freestyle continuous glucose monitoring found out his correction was too high, he had a few episodes of glucose 500 then overcorrected went to hypoglycemia.   Discussed with patient we will start new correction.    HPI: A 24 yo male here for the evaluation of type 1 diabetes.  Patient was diagnosed type 1 diabetes at age 6 with a symptom of significant weight loss dehydration polyuria polydipsia.  Patient has been on insulin since then never had DKA no hypoglycemic episode which required ambulance.  Patient switched to provider many times due to switching his parents insurance.   Last A1C 10.4 8 month agao.   He currently working in a factory as a night shift, if he forgets NovoLog twice a week, forget basilar approximately once a months.       Current Regimens:  Basaglar: 29 units at 8 am  Novolo unit: 8 g   Joardiance 10 mg daily  Glucose more than 200 mg/dl  Correct 1 units per 50 mg/dl  CGM - chelly free style    Life Style:  Wake up 4-5 pm   Breakfast 6 -7 pm eggs toast, burrito (usually inject: 9 units)  Lunch  9 pm bar, organic dry ankur (not)  Dinner  1-2 am  Soup, meat (bigger meal)  (usually 12units)  At home: 3 eggs 8 am, then sleep (not)  Bedtime    Exercise:  Sometimes biking     DM complications:  Retinopathy: 2 years, need apotiemtn   Nephropathy:   Neuropathy: no  Most recent LDL:   LDL Cholesterol  "Calculated   Date Value Ref Range Status   09/02/2014 86 0 - 129 mg/dL Final     Comment:     LDL Cholesterol is the primary guide to therapy: LDL-cholesterol goal in high   risk patients is <100 mg/dL and in very high risk patients is <70 mg/dL.       LDL Cholesterol Direct   Date Value Ref Range Status   07/05/2018 94 <100 mg/dL Final     Comment:     Desirable:       <100 mg/dl     Continuous glucose monitoring: not available today, he is not checking        Past Medical/Surgical History:  Past Medical History:   Diagnosis Date     Type 1 diabetes mellitus (H)    anxiety depression  Past Surgical History:   Procedure Laterality Date     NO HISTORY OF SURGERY         Allergies:  No Known Allergies    Current Medications   Current Outpatient Medications   Medication     Biotin 2500 MCG CAPS     finasteride (PROPECIA) 1 MG tablet     insulin glargine (LANTUS SOLOSTAR) 100 UNIT/ML pen     insulin lispro (HUMALOG) 100 UNIT/ML vial     insulin pen needle (32G X 4 MM) 32G X 4 MM miscellaneous     insulin syringe-needle U-100 (BD INSULIN SYRINGE ULTRAFINE) 31G X 5/16\" 0.5 ML MISC     montelukast (SINGULAIR) 10 MG tablet     ACE/ARB/ARNI NOT PRESCRIBED, INTENTIONAL,     blood glucose monitoring (NO BRAND SPECIFIED) test strip     Continuous Blood Gluc  (FREESTYLE CADEN 14 DAY READER) MERCEDES     Continuous Blood Gluc Sensor (FREESTYLE CADEN 14 DAY SENSOR) MISC     FLUoxetine (PROZAC) 20 MG capsule     fluticasone (FLONASE) 50 MCG/ACT spray     glucagon (GLUCAGON EMERGENCY) 1 MG KIT     No current facility-administered medications for this visit.        Family History:  Family History   Problem Relation Age of Onset     Asthma Mother    DM: maternal grandmother: DM2,  Sister: lactose intolerance    Social History:  Social History     Tobacco Use     Smoking status: Never Smoker     Smokeless tobacco: Never Used   Substance Use Topics     Alcohol use: Yes     Comment: rare    in factory, [employer] " with parents.     ROS:  Full review of systems taken with the help of the intake sheet. Otherwise a complete 14 point review of systems was taken and is negative unless stated in the history above.      Physical Exam:   There were no vitals taken for this visit.    General: well appearing, no acute distress, pleasant and conversant,   Mental Status/neuro: alert and oriented  Face: symmetrical, normal facial color  Eyes: anicteric, PERRL, no proptosis or lid lag  Neck: suppler, no lymphadenopahty  Thyroid: normal size and texture, no nodule palpable, no bruits  Heart: regular rhythm, S1S2, no murmur appreciated  Lung: clear to auscultation bilaterally  Abdomen: soft, NT/ND, no hepatomegaly  Legs: no swelling or edema  Feet: no deformities or ulcers, 2+ DP pulses, normal monofilament sensation      Labs : I reviewed data from epic and extract and summarize the pertinent data here.   Lab Results   Component Value Date     06/22/2014      Lab Results   Component Value Date    POTASSIUM 3.9 06/22/2014     Lab Results   Component Value Date    CHLORIDE 106 06/22/2014     Lab Results   Component Value Date    STEFFI 9.8 06/22/2014     Lab Results   Component Value Date    CO2 23 06/22/2014     Lab Results   Component Value Date    BUN 9 06/22/2014     Lab Results   Component Value Date    CR 0.77 07/05/2018     Lab Results   Component Value Date     06/22/2014     Lab Results   Component Value Date    TSH 1.76 07/05/2018     Lab Results   Component Value Date    T4 1.04 09/02/2014     Lab Results   Component Value Date    A1C 12.4 01/18/2019               Again, thank you for allowing me to participate in the care of your patient.        Sincerely,        Zonia Atkins MD

## 2021-01-11 ENCOUNTER — TELEPHONE (OUTPATIENT)
Dept: ENDOCRINOLOGY | Facility: CLINIC | Age: 26
End: 2021-01-11

## 2021-01-11 DIAGNOSIS — E11.9 TYPE 2 DIABETES MELLITUS WITHOUT COMPLICATION, WITHOUT LONG-TERM CURRENT USE OF INSULIN (H): Primary | ICD-10-CM

## 2021-01-11 NOTE — TELEPHONE ENCOUNTER
PA denied via EPA- waiting for fax from insurance with denial reasoning.    Denied  1/11/2021 12:22 PM  Case ID: [id number] Appeal supported: Yes   Note from payer: CaseId:[id number];Status:Denied;Review Type:Prior Auth;Appeal Information: Attention:ATTN: CLINICAL APPEALS DEPARTMENT [employer] [address] [address],[place], [place],[address] Phone:[phone] Fax:[phone]; Important - Please read the below note on eAppeals: Please reference the denial letter for information on the rights for an appeal, rationale for the denial, and how to submit an appeal including if any information is needed to support the appeal. Note about urgent situations - Generally, an urgent situation is one which, in the opinion of the provider, the health of the patient may be in serious jeopardy or may experience pain that cannot be adequately controlled while waiting for a decision on the appeal.;   Payer:  [employer]    [phone]

## 2021-01-12 NOTE — TELEPHONE ENCOUNTER
Central Prior Authorization Team   Phone: [phone]      PRIOR AUTHORIZATION DENIED    Medication: JARDIANCE 10 MG TABS - DENIED    Denial Date: 1/11/2021    Denial Rational:       Appeal Information:

## 2021-01-20 NOTE — TELEPHONE ENCOUNTER
Prior authorization denial reviewed by Dr. Atkins.    Per Dr. Atkins:    If patient agrees. Metofrmin 500 mg BID, if so, could you help me Rx?     Zonia         Patient advised of Dr. Atkins's recommendations. Patient verbalizes understanding and agrees to plan. Reviewed common side effects with patient and requested he call with any questions or concerns. Patient verbalizes understanding and agrees to plan.       Will send to Dr. Atkins to sign Rx.      Yvette Juares RN  Endocrine Care Coordinator  [facility]

## 2021-02-14 DIAGNOSIS — E10.9 TYPE 1 DIABETES MELLITUS (H): ICD-10-CM

## 2021-02-16 NOTE — TELEPHONE ENCOUNTER
insulin glargine (LANTUS SOLOSTAR) 100 UNIT/ML pen   Last Written Prescription Date: 11/30/20  Last Fill Quantity: 15ml,   # refills: 1  Last Office Visit : 1/8/21  MG  Future Office visit: none      Routing refill request to provider for review/approval because: endo triage to fill

## 2021-04-05 DIAGNOSIS — E10.9 TYPE 1 DIABETES MELLITUS WITHOUT COMPLICATION (H): ICD-10-CM

## 2021-04-05 NOTE — TELEPHONE ENCOUNTER
HUMALOG 100 UNIT/ML VIAL      Last Written Prescription Date:  3/20/20  Last Fill Quantity: 30 ml,   # refills: 2  Last Office Visit : 1/8/21 recommended 1 month follow up with Ghazal, 6 month with Dr Atkins  Future Office visit:  None scheduled    Routing refill request to provider for review/approval because:  Insulin - refilled per clinic

## 2021-06-09 NOTE — TELEPHONE ENCOUNTER
90 day supp req for auth #[id number]. Please check pending order as I did not change quantity.  
Pt has endocrinologist  Appointment scheduled on 1/18/19.  Pt advised at ov on 8/24/18 to follow up in 2-3 months to establish with new pmd.  To provider to advise.  Arabella DEL VALLEN, RN    
not examined

## 2021-10-12 DIAGNOSIS — E10.9 TYPE 1 DIABETES MELLITUS (H): ICD-10-CM

## 2021-10-12 NOTE — TELEPHONE ENCOUNTER
Will forward to ADRI Prescott to review.    Christina Christianson LPN  Adult Endocrinology   [facility]

## 2021-10-12 NOTE — TELEPHONE ENCOUNTER
LANTUS SOLOSTAR 100 UNIT/ML      Last Written Prescription Date:  2/17/21  Last Fill Quantity: 15 ml,   # refills: 2  Last Office Visit : 1/8/21 recommended 6 month follow up with provider and 1 month with Ghazal  Future Office visit:  None scheduled    Routing refill request to provider for review/approval because:  Insulin - refilled per clinic    Lab Test 07/05/18  0913   CR 0.77     Lab Test 01/18/19  0000   A1C 12.4*     Nothing more recent in care everywhere nor media

## 2021-10-13 NOTE — TELEPHONE ENCOUNTER
10/13 Called and left voicemail. Provided phone number [phone] to schedule follow up with Dr. Atkins.     Alcira fuller Procedure   Orthopedics, Podiatry, Sports Medicine, ENT/Eye Specialties  [facility]   [phone]

## 2021-10-20 NOTE — TELEPHONE ENCOUNTER
10/20 2nd attempt.  Called and left voicemail. Provided phone number [phone] to schedule follow up with Dr. Atkins.      Alcira fuller Procedure   Orthopedics, Podiatry, Sports Medicine, ENT/Eye Specialties  [facility]   [phone]

## 2022-02-04 ENCOUNTER — TELEPHONE (OUTPATIENT)
Dept: ENDOCRINOLOGY | Facility: CLINIC | Age: 27
End: 2022-02-04
Payer: COMMERCIAL

## 2022-02-04 DIAGNOSIS — E10.9 TYPE 1 DIABETES MELLITUS WITHOUT COMPLICATION (H): Primary | ICD-10-CM

## 2022-02-04 RX ORDER — INSULIN GLARGINE-YFGN 100 [IU]/ML
29 INJECTION, SOLUTION SUBCUTANEOUS DAILY
Qty: 15 ML | Refills: 3 | Status: SHIPPED | OUTPATIENT
Start: 2022-02-04 | End: 2022-08-10

## 2022-02-04 NOTE — TELEPHONE ENCOUNTER
[facility] Call Center    Phone Message    May a detailed message be left on voicemail: yes     Reason for Call: Medication Question or concern regarding medication   Prescription Clarification    Name of Medication:   * insulin glargine (LANTUS SOLOSTAR) 100 UNIT/ML pen    Prescribing Provider: Wilbert     Pharmacy: [facility]/PHARMACY #[id number] - [place], [place] - [address]     What on the order needs clarification? Per Patient states his insurance is no longer covering the Lantus medication and wanting patient to switch to Semglee instead as that would be covered.  Please advise.       Action Taken: Message routed to:  [facility] Endo    Travel Screening: Not Applicable

## 2022-02-14 ENCOUNTER — TELEPHONE (OUTPATIENT)
Dept: ENDOCRINOLOGY | Facility: CLINIC | Age: 27
End: 2022-02-14
Payer: COMMERCIAL

## 2022-02-14 DIAGNOSIS — E10.9 TYPE 1 DIABETES MELLITUS WITHOUT COMPLICATION (H): Primary | ICD-10-CM

## 2022-02-14 NOTE — TELEPHONE ENCOUNTER
[facility] Call Center    Phone Message    May a detailed message be left on voicemail: yes     Reason for Call: Order(s): Other:   Reason for requested: Pt needs labs prior to appt on 6/24/22  Date needed: whenever possible    Provider name: Wilbert    Pt stated he usually does labs just before appt but was unable to schedule due to no active orders.  Please submit so pt can schedule.  Let pt know when he can schedule.        Action Taken: Message routed to:  Adult Clinics: Endocrinology p [id number]    Travel Screening: Not Applicable

## 2022-03-25 NOTE — TELEPHONE ENCOUNTER
Looks like this question has not been addressed will forward to Dr. Atkins again to review and advise.    Krystyna Yuen, WALLACE  Adult Endocrinology  [facility], [place]

## 2022-03-28 NOTE — TELEPHONE ENCOUNTER
Patient informed that orders have been placed. The patient had to cancel his appointment on 6/24/2022 due to possible job change. Spoke with patient and he will call to schedule once he knows if he got the job or not.    Krystyna Yuen CMA  Adult Endocrinology  [facility]

## 2022-04-01 ENCOUNTER — TELEPHONE (OUTPATIENT)
Dept: ENDOCRINOLOGY | Facility: CLINIC | Age: 27
End: 2022-04-01
Payer: COMMERCIAL

## 2022-04-01 DIAGNOSIS — E10.65 TYPE 1 DIABETES MELLITUS WITH HYPERGLYCEMIA (H): ICD-10-CM

## 2022-04-01 NOTE — TELEPHONE ENCOUNTER
[facility] Call Center    Phone Message    May a detailed message be left on voicemail: yes     Reason for Call: Medication Refill Request    Has the patient contacted the pharmacy for the refill? Yes   Name of medication being requested:insulin syringe-needle U-100 (BD INSULIN SYRINGE ULTRAFINE) 31G X 5/16\" 0.5 ML [facility]  Provider who prescribed the medication: Dr. Atkins  Pharmacy: [facility]/PHARMACY #[id number] [facility] [address] [address] [facility]  Date medication is needed: asap         Action Taken: Other: ENDO    Travel Screening: Not Applicable                                                                      "

## 2022-04-01 NOTE — TELEPHONE ENCOUNTER
[facility] Call Center    Phone Message    May a detailed message be left on voicemail: yes     Reason for Call: Order(s): Other:   Reason for requested: Patient requesting orders for Lab work to be extended to October of 2022  For the 10/28/2022 follow up appointment with Dr. Atkins also requesting  orders for a new Blood Glucose Monitor. Please follow up. Thank you.   Date needed: asap  Provider name: Dr. Atkins      Action Taken: Other: ENDO    Travel Screening: Not Applicable

## 2022-04-04 RX ORDER — SYRINGE-NEEDLE,INSULIN,0.5 ML 27GX1/2"
SYRINGE, EMPTY DISPOSABLE MISCELLANEOUS
Qty: 200 EACH | Refills: 11 | Status: SHIPPED | OUTPATIENT
Start: 2022-04-04

## 2022-07-19 ENCOUNTER — TELEPHONE (OUTPATIENT)
Dept: FAMILY MEDICINE | Facility: OTHER | Age: 27
End: 2022-07-19

## 2022-07-19 NOTE — TELEPHONE ENCOUNTER
Reason for call:  Other   Patient called regarding (reason for call): appointment and call back  Additional comments:   Patient needs an MD to fill out paperwork for short term disability. 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 He does not have a primary care provider but is seeing an eye Dr or surgeon, patient has more info, of course.  Please call patient to discuss.    Phone number to reach patient:  Cell number on file:    Telephone Information:   Mobile [phone]       Best Time:  any    Can we leave a detailed message on this number?  YES    Travel screening: Not Applicable

## 2022-07-19 NOTE — TELEPHONE ENCOUNTER
I have never met the patient before, would be best to set him up for an appointment to go over this paperwork.  You can set him up with an appointment at any time with myself in person within the next week or 2.  Feel free to use any appointment slot.      Thank you,    Cullen Mazariegos MD

## 2022-07-20 ENCOUNTER — NURSE TRIAGE (OUTPATIENT)
Dept: FAMILY MEDICINE | Facility: OTHER | Age: 27
End: 2022-07-20

## 2022-07-20 NOTE — TELEPHONE ENCOUNTER
SITUATION:   Patient calling today to see if he can be seen earlier than 07/25/22.   Patient describes having internal bleeding in his left eye due to his DM. Symptoms started 2 weeks ago. He had a retinal surgery back in December/January. Currently there is blood pooling in his left eye and he has lost vision. He only see's a grey fuzziness out of his left eye. He is scheduled to see the retinal surgeon on 07/27/22.   He denies having any pain.     Patient is concerned, due to his job. As he has been unable to work. He is needing his short term disability filled out. He feels Monday is too late and is concerned he will lose his job.     BACKGROUND:   Type 1 DM (Uncontrolled)    HOME TREATMENTS:  NA  HX of Retina Surgery      PATIENT REQUEST:   To be worked in to have his disability paperwork filled out. He is currently scheduled with  on Monday 07/25/22.     PLAN:   Huddle with provider.   Recommendation is for the patient to go to the ED or see an ophthalmologist for further evaluation.   Patient understood plan.       ELIGIO DaleN, RN, PHN  [facility]  July 20, 2022    Reason for Disposition    Complete loss of vision in 1 or both eyes    Additional Information    Negative: Weakness of the face, arm or leg on one side of the body    Negative: Followed getting substance in the eye    Negative: Foreign body or object is or was lodged in the eye    Negative: Followed an eye injury    Negative: Followed sun lamp or sun exposure (UV keratitis)    Negative: Yellow or green discharge (pus) in the eye    Negative: Pregnant    Protocols used: VISION LOSS OR CHANGE-A-OH

## 2022-07-21 ENCOUNTER — OFFICE VISIT (OUTPATIENT)
Dept: URGENT CARE | Facility: URGENT CARE | Age: 27
End: 2022-07-21
Payer: COMMERCIAL

## 2022-07-21 VITALS
TEMPERATURE: 97.5 F | BODY MASS INDEX: 24.05 KG/M2 | SYSTOLIC BLOOD PRESSURE: 124 MMHG | OXYGEN SATURATION: 97 % | DIASTOLIC BLOOD PRESSURE: 86 MMHG | HEART RATE: 84 BPM | WEIGHT: 157.2 LBS

## 2022-07-21 DIAGNOSIS — E10.319 TYPE 1 DIABETES MELLITUS WITH RETINOPATHY OF BOTH EYES, MACULAR EDEMA PRESENCE UNSPECIFIED, UNSPECIFIED RETINOPATHY SEVERITY (H): Primary | ICD-10-CM

## 2022-07-21 PROCEDURE — 99212 OFFICE O/P EST SF 10 MIN: CPT | Performed by: PHYSICIAN ASSISTANT

## 2022-07-21 NOTE — PROGRESS NOTES
Assessment & Plan     Type 1 diabetes mellitus with retinopathy of both eyes, macular edema presence unspecified, unspecified retinopathy severity (H)  Disability paperwork to be filled out by provider was able to evaluate for diabetic retinopathy.  Recommend that he have his paperwork filled out by the eye specialist.  I did write a note today to excuse him from work until he is able to be evaluated by the eye doctor.    15 minutes spent on the date of the encounter doing chart review, patient visit, and documentation     No follow-ups on file.     Arabella Salazar PA-C  [facility]    Subjective   Kain Varela is a 26 year old who presents for the following health issues     HPI    Kain presents today because of blurred vision in his left eye.  He first had vision changes in his right eye and was diagnosed with diabetic retinopathy in December 2021.  He went into be seen when the vision changes started and ended up having retinal surgery the same day.  He then developed vision changes in his left eye and had surgery in January.  2 weeks ago, his vision began changing again.  He called and spoke with the retinal specialist who decided his symptoms were not emergent and he was scheduled to be seen on July 27, 2022.  He is concerned because he has not been able to work since his symptoms first began around July 8 and he is concerned that he will lose his job without having any documentation.  He is not able to obtain short-term disability until his paperwork is filled out by an MD or DO.    Review of Systems   ROS negative except as stated above.      Objective    /86 (BP Location: Right arm, Patient Position: Sitting, Cuff Size: Adult Regular)   Pulse 84   Temp 97.5  F (36.4  C) (Tympanic)   Wt 71.3 kg (157 lb 3.2 oz)   SpO2 97%   BMI 24.05 kg/m    Physical Exam   GENERAL: healthy, alert and no distress    No results found for any visits on 07/21/22.

## 2022-07-21 NOTE — LETTER
[facility]  [address] [facility] [address]  Phone: [phone]    July 21, 2022        Kain Varela  [address] [facility] [address]          To whom it may concern:    RE: Kain Finnegan presents after vision changes in his left eye. Symptoms began around July 8, 2022. He has not been able to work with this change in vision. He will need to be evaluated by a retinal specialist given his history of diabetic retinopathy. The earliest he is able to be seen by this provider is July 27, 2022. It is unsafe for him to work before he is seen and evaluated. Please excuse him from work missed.    Please contact me for questions or concerns.      Sincerely,        Arabella Salazar PA-C

## 2022-07-26 DIAGNOSIS — E10.9 TYPE 1 DIABETES MELLITUS (H): ICD-10-CM

## 2022-07-27 ENCOUNTER — TRANSFERRED RECORDS (OUTPATIENT)
Dept: HEALTH INFORMATION MANAGEMENT | Facility: CLINIC | Age: 27
End: 2022-07-27

## 2022-07-27 ENCOUNTER — VIRTUAL VISIT (OUTPATIENT)
Dept: ENDOCRINOLOGY | Facility: CLINIC | Age: 27
End: 2022-07-27
Payer: COMMERCIAL

## 2022-07-27 ENCOUNTER — TELEPHONE (OUTPATIENT)
Dept: ENDOCRINOLOGY | Facility: CLINIC | Age: 27
End: 2022-07-27

## 2022-07-27 DIAGNOSIS — E10.3523 TYPE 1 DIABETES MELLITUS WITH BOTH EYES AFFECTED BY PROLIFERATIVE RETINOPATHY AND TRACTION RETINAL DETACHMENTS INVOLVING MACULAE (H): ICD-10-CM

## 2022-07-27 DIAGNOSIS — E10.65 TYPE 1 DIABETES MELLITUS WITH HYPERGLYCEMIA (H): Primary | ICD-10-CM

## 2022-07-27 LAB — RETINOPATHY: POSITIVE

## 2022-07-27 PROCEDURE — 99215 OFFICE O/P EST HI 40 MIN: CPT | Mod: 95 | Performed by: INTERNAL MEDICINE

## 2022-07-27 RX ORDER — GLIMEPIRIDE 2 MG/1
2 TABLET ORAL
Qty: 90 TABLET | Refills: 3 | Status: SHIPPED | OUTPATIENT
Start: 2022-07-27 | End: 2022-09-30

## 2022-07-27 NOTE — LETTER
7/27/2022       RE: Kain Varela  [address]  [place] [address]     Dear Colleague,    Thank you for referring your patient, Kain Varela, to the [facility] [place] at [facility]. Please see a copy of my visit note below.    Kain Varela  is being evaluated via a billable video visit.      How would you like to obtain your AVS? My [email or web address]  For the video visit, send the invitation by: Text to cell phone: [phone]   Will anyone else be joining your video visit? No      Outcome for 07/27/22 1:04 PM: Patient is not checking blood sugars  CLIFTON Gamboa    Video visit  Start: 1:30 pm  End  1:50 pm  [place]                                                                               - Endocrinology Follow up -    Reason for visit/consult:  Type 1 diabetes mellitus without complication (H)    Primary care provider: Amy Singh    Assessment and Plan  26 year old male with DM1, irregular life style, elevated A1c,    # DM1 poor control and complicated with retinal issue and significant loss of eye sight on the left eye    Poor compliance, A1C has been more than 10 since diagnosed. Not checking glucose, currently seen by ophthalmology for retinal issue and requiring urgent surgery     - prescribed Jourdan      - Continue Lantus (or long acting) 29 units    - Continue Novolog 1 unit: 8 gram carb  -Correction   Glucose more than 200 mg/dl  Correct 1 units per 50 mg/dl     - adding on Metformin 500 mg BID    - adding on glimepiride 2 mg dinner time    - repeat A1c    - Ghazal follow in in 2 weeks     # Compliance and motivation  We discussed today to encourage to pay more attention for his DM and takes care of his own health.   .  -Return to clinic with Ghazal in 2 week semi urgent     45   minutes spent on the date of the encounter doing chart review, history and exam, documentation , phone call to outside  provider, and further activities as noted above.    Zonia Atkins MD  Staff Physician  Endocrinology and Metabolism  [facility]  License: [id number]  Pager: [phone]    Interval History as of 2022 :Poor compliance, A1C has been more than 10 since diagnosed. Not checking glucose, currently seen by ophthalmology for retinal issue and requiring urgent surgery. We had a call from provider today and put him urgent slot.   Interval History as of 2021 : Patient has been doing well. Last  A1C 12.4 1 year ago. persistently 2 digits at least past 4 years  Doing 12 hours night shift. Not checking glucose, not taking jardiance.   Interval History: Started to chelly freestyle continuous glucose monitoring found out his correction was too high, he had a few episodes of glucose 500 then overcorrected went to hypoglycemia.   Discussed with patient we will start new correction.    HPI: A 24 yo male here for the evaluation of type 1 diabetes.  Patient was diagnosed type 1 diabetes at age 6 with a symptom of significant weight loss dehydration polyuria polydipsia.  Patient has been on insulin since then never had DKA no hypoglycemic episode which required ambulance.  Patient switched to provider many times due to switching his parents insurance.   Last A1C 10.4 8 month agao.   He currently working in a factory as a night shift, if he forgets NovoLog twice a week, forget basilar approximately once a months.       Current Regimens:  Basaglar: 29 units at 8 am  Novolo unit: 8 g   Glucose more than 200 mg/dl  Correct 1 units per 50 mg/dl  CGM - chelly free style (resuming )     Life Style:  Wake up 4-5 pm   Breakfast banana, granola bar at work  Dinner  james Webb, to go fast foods      Exercise:  Sometimes biking     DM complications:  Retinopathy: 2 years, need apotiemtn   Nephropathy:   Neuropathy: no  Most recent LDL:   LDL Cholesterol Calculated   Date Value Ref Range Status   2014 86 0 - 129  "mg/dL Final     Comment:     LDL Cholesterol is the primary guide to therapy: LDL-cholesterol goal in high   risk patients is <100 mg/dL and in very high risk patients is <70 mg/dL.       LDL Cholesterol Direct   Date Value Ref Range Status   07/05/2018 94 <100 mg/dL Final     Comment:     Desirable:       <100 mg/dl     Continuous glucose monitoring: not available today, he is not checking        Past Medical/Surgical History:  Past Medical History:   Diagnosis Date     Type 1 diabetes mellitus (H)    anxiety depression  Past Surgical History:   Procedure Laterality Date     NO HISTORY OF SURGERY         Allergies:  No Known Allergies    Current Medications   Current Outpatient Medications   Medication     blood glucose (NO BRAND SPECIFIED) lancets standard     blood glucose (NO BRAND SPECIFIED) test strip     blood glucose monitoring (NO BRAND SPECIFIED) meter device kit     finasteride (PROPECIA) 1 MG tablet     fluticasone (FLONASE) 50 MCG/ACT spray     glucagon (GLUCAGON EMERGENCY) 1 MG KIT     Insulin Glargine-yfgn (SEMGLEE, YFGN,) 100 UNIT/ML SOPN     insulin lispro (HUMALOG) 100 UNIT/ML vial     insulin pen needle (32G X 4 MM) 32G X 4 MM miscellaneous     insulin syringe-needle U-100 (BD INSULIN SYRINGE ULTRAFINE) 31G X 5/16\" 0.5 ML miscellaneous     ACE/ARB/ARNI NOT PRESCRIBED, INTENTIONAL,     Biotin 2500 MCG CAPS     Continuous Blood Gluc  (FREESTYLE CADEN 14 DAY READER) MERCEDES     Continuous Blood Gluc Sensor (FREESTYLE CADEN 14 DAY SENSOR) MISC     empagliflozin (JARDIANCE) 10 MG TABS tablet     FLUoxetine (PROZAC) 20 MG capsule     insulin glargine (LANTUS SOLOSTAR) 100 UNIT/ML pen     metFORMIN (GLUCOPHAGE) 500 MG tablet     montelukast (SINGULAIR) 10 MG tablet     No current facility-administered medications for this visit.       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     ROS:  Full review of systems taken with the help of the intake sheet. Otherwise a complete 14 point review of systems was taken and is negative unless stated in the history above.      Physical Exam:   There were no vitals taken for this visit.    General: well appearing, no acute distress, pleasant and conversant,   Mental Status/neuro: alert and oriented  Face: symmetrical, normal facial color  Eyes: anicteric, no proptosis or lid lag, per patient, unable to see left sight.   Resp: normally breathing      Labs : I reviewed data from epic and extract and summarize the pertinent data here.   Lab Results   Component Value Date     06/22/2014      Lab Results   Component Value Date    POTASSIUM 3.9 06/22/2014     Lab Results   Component Value Date    CHLORIDE 106 06/22/2014     Lab Results   Component Value Date    STEFFI 9.8 06/22/2014     Lab Results   Component Value Date    CO2 23 06/22/2014     Lab Results   Component Value Date    BUN 9 06/22/2014     Lab Results   Component Value Date    CR 0.77 07/05/2018     Lab Results   Component Value Date     06/22/2014     Lab Results   Component Value Date    TSH 1.76 07/05/2018     Lab Results   Component Value Date    T4 1.04 09/02/2014     Lab Results   Component Value Date    A1C 12.4 01/18/2019

## 2022-07-27 NOTE — TELEPHONE ENCOUNTER
Will send request below to Dr. Atkins to review and call Dr. Gerardo Ordoñez at [phone].    Please note patient has not been seen since 1/8/2021 but is scheduled for follow-up on 10/28/22,      Yvette Juares, RN  Endocrine Care Coordinator  [facility]

## 2022-07-27 NOTE — PROGRESS NOTES
Kain Varela  is being evaluated via a billable video visit.      How would you like to obtain your AVS? [email or web address]  For the video visit, send the invitation by: Text to cell phone: [phone]   Will anyone else be joining your video visit? No      Outcome for 07/27/22 1:04 PM: Patient is not checking blood sugars  Ginger Ivy, CLIFTON    Video visit  Start: 1:30 pm  End  1:50 pm  [place]                                                                               - Endocrinology Follow up -    Reason for visit/consult:  Type 1 diabetes mellitus without complication (H)    Primary care provider: Amy Singh    Assessment and Plan  26 year old male with DM1, irregular life style, elevated A1c,    # DM1 poor control and complicated with retinal issue and significant loss of eye sight on the left eye    Poor compliance, A1C has been more than 10 since diagnosed. Not checking glucose, currently seen by ophthalmology for retinal issue and requiring urgent surgery     - prescribed Jourdan      - Continue Lantus (or long acting) 29 units    - Continue Novolog 1 unit: 8 gram carb  -Correction   Glucose more than 200 mg/dl  Correct 1 units per 50 mg/dl     - adding on Metformin 500 mg BID    - adding on glimepiride 2 mg dinner time    - repeat A1c    - Ghazal follow in in 2 weeks     # Compliance and motivation  We discussed today to encourage to pay more attention for his DM and takes care of his own health.   .      -Return to clinic with Ghazal in 2 week semi urgent         45   minutes spent on the date of the encounter doing chart review, history and exam, documentation , phone call to outside provider, and further activities as noted above.    Zonia Atkins MD  Staff Physician  Endocrinology and Metabolism  [facility]  License: [id number]  Pager: [phone]    Interval History as of 7/27/2022 :Poor compliance, A1C has been more than 10 since diagnosed. Not checking glucose, currently  seen by ophthalmology for retinal issue and requiring urgent surgery. We had a call from provider today and put him urgent slot.   Interval History as of 2021 : Patient has been doing well. Last  A1C 12.4 1 year ago. persistently 2 digits at least past 4 years  Doing 12 hours night shift. Not checking glucose, not taking jardiance.   Interval History: Started to chelly freestyle continuous glucose monitoring found out his correction was too high, he had a few episodes of glucose 500 then overcorrected went to hypoglycemia.   Discussed with patient we will start new correction.    HPI: A 22 yo male here for the evaluation of type 1 diabetes.  Patient was diagnosed type 1 diabetes at age 6 with a symptom of significant weight loss dehydration polyuria polydipsia.  Patient has been on insulin since then never had DKA no hypoglycemic episode which required ambulance.  Patient switched to provider many times due to switching his parents insurance.   Last A1C 10.4 8 month agao.   He currently working in a factory as a night shift, if he forgets NovoLog twice a week, forget basilar approximately once a months.       Current Regimens:  Basaglar: 29 units at 8 am  Novolo unit: 8 g   Glucose more than 200 mg/dl  Correct 1 units per 50 mg/dl  CGM - chelly free style (resuming )     Life Style:  Wake up 4-5 pm   Breakfast banana, granola bar at work  Dinner  james Webb, to go fast foods      Exercise:  Sometimes biking     DM complications:  Retinopathy: 2 years, need apotiemtn   Nephropathy:   Neuropathy: no  Most recent LDL:   LDL Cholesterol Calculated   Date Value Ref Range Status   2014 86 0 - 129 mg/dL Final     Comment:     LDL Cholesterol is the primary guide to therapy: LDL-cholesterol goal in high   risk patients is <100 mg/dL and in very high risk patients is <70 mg/dL.       LDL Cholesterol Direct   Date Value Ref Range Status   2018 94 <100 mg/dL Final     Comment:     Desirable:       <100  "mg/dl     Continuous glucose monitoring: not available today, he is not checking        Past Medical/Surgical History:  Past Medical History:   Diagnosis Date     Type 1 diabetes mellitus (H)    anxiety depression  Past Surgical History:   Procedure Laterality Date     NO HISTORY OF SURGERY         Allergies:  No Known Allergies    Current Medications   Current Outpatient Medications   Medication     blood glucose (NO BRAND SPECIFIED) lancets standard     blood glucose (NO BRAND SPECIFIED) test strip     blood glucose monitoring (NO BRAND SPECIFIED) meter device kit     finasteride (PROPECIA) 1 MG tablet     fluticasone (FLONASE) 50 MCG/ACT spray     glucagon (GLUCAGON EMERGENCY) 1 MG KIT     Insulin Glargine-yfgn (SEMGLEE, YFGN,) 100 UNIT/ML SOPN     insulin lispro (HUMALOG) 100 UNIT/ML vial     insulin pen needle (32G X 4 MM) 32G X 4 MM miscellaneous     insulin syringe-needle U-100 (BD INSULIN SYRINGE ULTRAFINE) 31G X 5/16\" 0.5 ML miscellaneous     ACE/ARB/ARNI NOT PRESCRIBED, INTENTIONAL,     Biotin 2500 MCG CAPS     Continuous Blood Gluc  (FREESTYLE CADEN 14 DAY READER) MERCEDES     Continuous Blood Gluc Sensor (FREESTYLE CADEN 14 DAY SENSOR) MISC     empagliflozin (JARDIANCE) 10 MG TABS tablet     FLUoxetine (PROZAC) 20 MG capsule     insulin glargine (LANTUS SOLOSTAR) 100 UNIT/ML pen     metFORMIN (GLUCOPHAGE) 500 MG tablet     montelukast (SINGULAIR) 10 MG tablet     No current facility-administered medications for this visit.       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     ROS:  Full review of systems taken with the help of the intake sheet. Otherwise a complete " 14 point review of systems was taken and is negative unless stated in the history above.      Physical Exam:   There were no vitals taken for this visit.    General: well appearing, no acute distress, pleasant and conversant,   Mental Status/neuro: alert and oriented  Face: symmetrical, normal facial color  Eyes: anicteric, no proptosis or lid lag, per patient, unable to see left sight.   Resp: normally breathing      Labs : I reviewed data from epic and extract and summarize the pertinent data here.   Lab Results   Component Value Date     06/22/2014      Lab Results   Component Value Date    POTASSIUM 3.9 06/22/2014     Lab Results   Component Value Date    CHLORIDE 106 06/22/2014     Lab Results   Component Value Date    STEFFI 9.8 06/22/2014     Lab Results   Component Value Date    CO2 23 06/22/2014     Lab Results   Component Value Date    BUN 9 06/22/2014     Lab Results   Component Value Date    CR 0.77 07/05/2018     Lab Results   Component Value Date     06/22/2014     Lab Results   Component Value Date    TSH 1.76 07/05/2018     Lab Results   Component Value Date    T4 1.04 09/02/2014     Lab Results   Component Value Date    A1C 12.4 01/18/2019

## 2022-07-27 NOTE — TELEPHONE ENCOUNTER
1/8/2021  [facility], scheduled for Dr Atkins 10/28/22 anuja refill sent through appt 10/28/22

## 2022-07-27 NOTE — TELEPHONE ENCOUNTER
Dr. Atkins spoke with Dr. Ruvalcaba and patient will be added on today at 1:30pm virtual [facility] schedule.    Patient updated. Patient verbalizes understanding and agrees to plan.      Yvette Juares RN  Endocrine Care Coordinator  [facility]

## 2022-07-27 NOTE — TELEPHONE ENCOUNTER
[facility] Call Center    Phone Message    May a detailed message be left on voicemail: yes     Reason for Call: Other: Urgent Provider to Provider Call back requested regarding patient.      Action Taken: Other: ENDO    Travel Screening: Not Applicable

## 2022-07-27 NOTE — TELEPHONE ENCOUNTER
Pt called and stated below message is in regards to pt having a retinal detachment and needs to be seen as soon as possible to be cleared for surgery.

## 2022-07-28 ENCOUNTER — TELEPHONE (OUTPATIENT)
Dept: EDUCATION SERVICES | Facility: CLINIC | Age: 27
End: 2022-07-28

## 2022-07-28 NOTE — TELEPHONE ENCOUNTER
Kain J Hanson called and scheduled to see Ghazal Grant RN, [facility] on Aug [facility].    Nayeli Galdamez RN, [facility]

## 2022-07-28 NOTE — TELEPHONE ENCOUNTER
----- Message from Viviana Juares RN sent at 7/28/2022 12:12 PM CDT -----  Regarding: FW: could you follow up in 2 weeks please    ----- Message -----  From: Zonia Atkins MD  Sent: 7/27/2022   7:39 PM CDT  To: Viviana Juares RN  Subject: RE: could you follow up in 2 weeks please        Not me, CDE  ----- Message -----  From: Viviana Juares RN  Sent: 7/27/2022   3:43 PM CDT  To: Zonia Atkins MD, #  Subject: RE: could you follow up in 2 weeks please        Jaun Atkins,    Just wanted to confirm. Do you want CDE to follow-up in 2 weeks or you want follow-up with you in 2 weeks?    Thanks!  Yvette      ----- Message -----  From: Zonia Atkins MD  Sent: 7/27/2022   1:57 PM CDT  To: Viviana Juares, RN, Ghazal Grant RN  Subject: could you follow up in 2 weeks please            A1c 12, not monitoring, now he is losing his eye sight. He admitted he needs ot take care of himself.   Ghazal, could you watch and follow up him DM and mentally as well?    Long acting 29 unit  Short 1:8  Started Metofmrn 500 BID  Started glimepirde 2 mg dinner time    Zonia

## 2022-07-29 ENCOUNTER — TELEPHONE (OUTPATIENT)
Dept: EDUCATION SERVICES | Facility: CLINIC | Age: 27
End: 2022-07-29

## 2022-07-29 NOTE — TELEPHONE ENCOUNTER
Appt made with Cde for Aug 10th. Pt aware.     Ghazal Grant, RN, BSN, CDCES   Certified Diabetes Care/  [facility]

## 2022-07-29 NOTE — TELEPHONE ENCOUNTER
----- Message from Zonia Atkins MD sent at 7/27/2022  1:55 PM CDT -----  Regarding: could you follow up in 2 weeks please  A1c 12, not monitoring, now he is losing his eye sight. He admitted he needs ot take care of himself.   Ghazal, could you watch and follow up him DM and mentally as well?    Long acting 29 unit  Short 1:8  Started Metofmrn 500 BID  Started glimepirde 2 mg dinner time    Zonia

## 2022-08-10 ENCOUNTER — ALLIED HEALTH/NURSE VISIT (OUTPATIENT)
Dept: EDUCATION SERVICES | Facility: CLINIC | Age: 27
End: 2022-08-10
Payer: COMMERCIAL

## 2022-08-10 DIAGNOSIS — E10.65 TYPE 1 DIABETES MELLITUS WITH HYPERGLYCEMIA (H): ICD-10-CM

## 2022-08-10 DIAGNOSIS — E10.9 TYPE 1 DIABETES MELLITUS WITHOUT COMPLICATION (H): ICD-10-CM

## 2022-08-10 LAB — HBA1C MFR BLD: 13 % (ref 0–5.6)

## 2022-08-10 PROCEDURE — 36415 COLL VENOUS BLD VENIPUNCTURE: CPT

## 2022-08-10 PROCEDURE — G0108 DIAB MANAGE TRN  PER INDIV: HCPCS

## 2022-08-10 PROCEDURE — 83036 HEMOGLOBIN GLYCOSYLATED A1C: CPT

## 2022-08-10 RX ORDER — GLUCAGON 3 MG/1
1 POWDER NASAL SEE ADMIN INSTRUCTIONS
Qty: 1 EACH | Refills: 1 | Status: SHIPPED | OUTPATIENT
Start: 2022-08-10

## 2022-08-10 RX ORDER — INSULIN GLARGINE-YFGN 100 [IU]/ML
35 INJECTION, SOLUTION SUBCUTANEOUS DAILY
Qty: 45 ML | Refills: 2 | Status: SHIPPED | OUTPATIENT
Start: 2022-08-10 | End: 2022-09-30

## 2022-08-10 NOTE — PROGRESS NOTES
"Diabetes Self Management Training: Individual Review Visit    Kain Varela presents today for education and evaluation of glucose control related to Type 1 diabetes.    He is accompanied by self    Patient's diabetes management related comments/concerns: Improving blood glucose control.    Patient's emotional response to diabetes: expresses readiness to learn    Patient would like this visit to be focused around the following diabetes-related behaviors and goals: Assistance with making lifestyle changes    ASSESSMENT:  Emili was referred to Cde for bg and medication review as well as a general review of diabetes self-care mgmt. He was seen by Dr Atkins on 07/27/22. At that visit, Glimepiride and Metformin were added to his diabetes medication regimen. Diagnosed with Type 1 at age 6 (08/10/02). He has a history of poor compliance with diabetes self-mgmt care. A1c has been double digits for years. He verbalizes he is now interested in improving self-care..         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 Surgery is postponed until bg levels improve. Initial diagnois of retinopathy, 2021. He is currenlty keeping simliar hours as his girlfriend works night-shift.      Pt ia able to feel symptoms of low bg. Feels, shaky, hot and sweaty, weak, lightheded. Usually feels symptoms in the \"90's\". Feels achy, thirsty and gets a headache with hyperglycemia.       Current Diabetes Management per Patient:  Taking diabetes medications? yes:  See below  Diabetes Medication(s)     Biguanides       metFORMIN (GLUCOPHAGE) 500 MG tablet    Take 1 tablet (500 mg) by mouth 2 times daily (with meals)     metFORMIN (GLUCOPHAGE) 500 MG tablet    Take 1 tablet (500 mg) by mouth 2 times daily (with meals)     Patient not taking: Reported on 7/27/2022    Diabetic Other       Glucagon (BAQSIMI ONE PACK) 3 " MG/DOSE POWD    Spray 1 each in nostril See Admin Instructions     glucagon (GLUCAGON EMERGENCY) 1 MG KIT    inject 1 mg as directed as needed.    Insulin       insulin glargine (LANTUS SOLOSTAR) 100 UNIT/ML pen    INJECT 29 UNITS SUB-Q DAILY ADD 2 UNITS TO PRIME PEN.     Patient not taking: Reported on 2022     Insulin Glargine-yfgn (SEMGLEE, YFGN,) 100 UNIT/ML SOPN    Inject 35 Units Subcutaneous daily Add 2 units to prime pen.     insulin lispro (HUMALOG) 100 UNIT/ML vial    INJECT 1 UNIT PER 8 GRAM CARBS. MAX DAILY DOSE  UNITS    Sodium-Glucose Co-Transporter 2 (SGLT2) Inhibitors       empagliflozin (JARDIANCE) 10 MG TABS tablet    Take 1 tablet (10 mg) by mouth daily     Patient not taking: Reported on 2022    Sulfonylureas       glimepiride (AMARYL) 2 MG tablet    Take 1 tablet (2 mg) by mouth daily (before supper)          Do you have any difficulty affording your medications or glucose monitoring supplies?       No    Patient glucose self monitoring as follows: Uses Jourdan 2 Cgms.    BG meter: Has a One Touch Verio meter at home. Asked to check expiration date of strips. If , he will call Cde for a refill.     BG RESULTS: Per Jourdan 2 report:   Ave B which corresponds to a GMI of 9.9%. Pt advised A1c need to be below 8% before surgery can be done.   85% readings above target. 14% in target. 1% below target.  Pattern noted of hyperglycemia over a 24 hour period except with occasional hypoglycemia which appears to be caused by taking too much Humalog for food intake and/or overcorrecting for elevated bg levels.         BG values are: Not in goal  Patient's most recent   Lab Results   Component Value Date    A1C 13.0 08/10/2022    A1C 12.4 2019    is meeting goal of <7.0    Nutrition:  Patient eats 3 meals per day. Pt wakes up around 10pm. Goes to be around 1pm.    Breakfast - 12pm: cereal with milk or waffles or leftovers  Lunch - 5pm: tacos or omelette of burger   Dinner -  "9am: tacos, burger or stir quezada   Snacks - cheese, nuts, carrots    Beverages: sometimes has reg soda with a meal but will cover with insulin or sparkling water or sugar free iced tea.     Cultural/Hindu diet restrictions: No.     Biggest Challenge to Healthy Eating: none    Physical Activity:    Job is very physical Other than that has no regular exercise routine.     Relevant co-morbidities and related health problems:  Significant for:  Retinopathy    Diabetes Complications:  Not discussed today.    Recent health service and resource utilization related to diabetes (hyperglycemia, hypoglycemia, etc):   None    Vitals:  There were no vitals taken for this visit.  Estimated body mass index is 24.05 kg/m  as calculated from the following:    Height as of 1/18/19: 1.722 m (5' 7.8\").    Weight as of 7/21/22: 71.3 kg (157 lb 3.2 oz).   Last 3 BP:   BP Readings from Last 3 Encounters:   07/21/22 124/86   03/15/19 110/76   01/18/19 135/80       History   Smoking Status     Never Smoker   Smokeless Tobacco     Never Used       Labs:  Lab Results   Component Value Date    A1C 13.0 08/10/2022    A1C 12.4 01/18/2019     Lab Results   Component Value Date     06/22/2014     Lab Results   Component Value Date    LDL 94 07/05/2018    LDL 86 09/02/2014     HDL Cholesterol   Date Value Ref Range Status   09/02/2014 51 >40 mg/dL Final   ]  GFR Estimate   Date Value Ref Range Status   07/05/2018 >90 >60 mL/min/1.7m2 Final     Comment:     Non  GFR Calc     GFR Estimate If Black   Date Value Ref Range Status   07/05/2018 >90 >60 mL/min/1.7m2 Final     Comment:      GFR Calc     Lab Results   Component Value Date    CR 0.77 07/05/2018       Socio/Economic/Cultural Considerations:    Support system: significant other    Cultural Influences/Ethnic Background:  Choose not to answer      Health Literacy/Numeracy:  \"With diabetes, it's helpful to use forms and log books to write down blood sugars " and what you're eating at times to help understand how foods affect your blood sugars. With this in mind how confident are you at filling out medical forms, such as these, by yourself?  Not Assessed    Health Beliefs and Attitudes:   Patient Activation Measure Survey Score:  CATIE Score (Last Two) 7/23/2015   CATIE Raw Score 52   Activation Score 100   CATIE Level 4       Stage of Change: PREPARATION (Decided to change - considering how)      Diabetes knowledge and skills assessment:     Patient is knowledgeable in diabetes management concepts related to: Monitoring and Taking Medication    Patient needs further education on the following diabetes management concepts: Problem Solving and Healthy Coping    Barriers to Learning Assessment: No Barriers identified    Based on learning assessment above, most appropriate setting for further diabetes education would be: Individual setting.    INTERVENTION:   Education provided today on:  Monitoring: individual blood glucose targets, need to scan sensor regulary to prevent lapse in recording graph.     Taking Medication: action of insulin(s). Takes Humalog in abdomen. Advised to stay away from hardened tissue. Takes Semglee in hip.     Problem Solving: carrying a carbohydrate source at all times and safe travel. S/S and proper tx hypoglycemia.     Opportunities for ongoing education and support in diabetes-self management were discussed.    Pt verbalized understanding of concepts discussed and recommendations provided today.       Education Materials Provided:  Action of insulin.     PLAN:  Monitoring: Rviewed bg targets: 75% readings should be between .     Taking Medication: Lantus (Semglee): increase dose form 29 to 35 units daily / takes at 12pm daily. Humalog: meal ratio 1:8 to 1:10. CF: 1:30 > 150 to 1:40 > 140. Take Humalog 15 minutes prior to eating. Admits to forgetting breakfast dose Metformin fairly regularly. Will work on not missing this dose.     Problem  Solving: carry glucose tablets with you at all times. Keep some in glove compartment of car.      FOLLOW-UP:  F/u scheduled wth Endo at end of Sept.     Ongoing plan for education and support: Return to see CDE in 3 weeks.     Time Spent: 60 minutes  Encounter Type: Individual    Any diabetes medication dose changes were made via the CDE Protocol and Collaborative Practice Agreement with the patient's endocrinology provider. A copy of this encounter was shared with the provider.    Kainrose Varela comes into clinic today at the request of Dr Atkins, Ordering Provider for Pt Teaching and bg review.     This service provided today was under the supervising provider of the day Dr Mars, who was available if needed.    Ghazal Grant, RN, BSN, [facility]   Certified Diabetes Care/  [facility]

## 2022-08-10 NOTE — PATIENT INSTRUCTIONS
Increase long-acting insulin form 29 to 35 units.     2.  Decrease Humalog dose from 1u:8g carbs to 1:10    3.  Ruiz Sliding Scale from 1:30 > 150 to 1:40 > 140.     4.  Return to see Diabetes Educator on Aug 31st at 9am.       Ghazal Grant, RN, BSN, [facility]   Certified Diabetes Care/  [facility]

## 2022-08-10 NOTE — LETTER
Patient:  Kain Varela  :   1995  MRN:     [id number]        Mr.Isaiah LAI Varela  [address] [facility] [address]        2022    Dear ,    We are writing to inform you of your test results. Still high A1C.    If you have any question, please call at [phone] ([facility] and [facility] patients), [phone] ([place] patients).     It was pleasure to meet you and please take care,        Zonia Atkins MD  [facility]  Endocrinology and Diabetes Clinic             Resulted Orders   Hemoglobin A1c   Result Value Ref Range    Hemoglobin A1C 13.0 (H) 0.0 - 5.6 %      Comment:      Normal <5.7%   Prediabetes 5.7-6.4%    Diabetes 6.5% or higher     Note: Adopted from ADA consensus guidelines.       Ghazal Grant RN

## 2022-08-18 ENCOUNTER — TELEPHONE (OUTPATIENT)
Dept: ENDOCRINOLOGY | Facility: CLINIC | Age: 27
End: 2022-08-18

## 2022-08-18 NOTE — TELEPHONE ENCOUNTER
Disability forms emailed to provider for review and signature.   Martha Hui RN on 8/18/2022 at 1:56 PM

## 2022-08-31 ENCOUNTER — ALLIED HEALTH/NURSE VISIT (OUTPATIENT)
Dept: EDUCATION SERVICES | Facility: CLINIC | Age: 27
End: 2022-08-31
Payer: COMMERCIAL

## 2022-08-31 DIAGNOSIS — E10.65 UNCONTROLLED TYPE 1 DIABETES MELLITUS WITH HYPERGLYCEMIA (H): Primary | ICD-10-CM

## 2022-08-31 DIAGNOSIS — E10.65 TYPE 1 DIABETES MELLITUS WITH HYPERGLYCEMIA (H): ICD-10-CM

## 2022-08-31 PROCEDURE — G0108 DIAB MANAGE TRN  PER INDIV: HCPCS

## 2022-08-31 NOTE — PROGRESS NOTES
"Diabetes Self Management Training: Follow-up Visit    Kain Varela presents today for evaluation of glucose control and education related to Type 2 diabetes.    He is accompanied by self    Patient's diabetes management related comments/concerns: Hyperglycemia    Patient would like this visit to be focused around the following diabetes-related behaviors and goals: Taking Medication.    ASSESSMENT:  Pt returns to clinic today for bg and medication review. The patient was initially referred to Cde for bg and medication review as well as a general review of diabetes self-care mgmt. He was seen by Dr Atkins on 07/27/22. At that visit, Glimepiride and Metformin were added to his diabetes medication regimen. Diagnosed with Type 1 at age 6 (08/10/02). He has a history of poor compliance with diabetes self-mgmt care. A1c has been double digits for years. He verbalizes he is now interested in improving self-care..          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 Surgery is postponed until bg levels improve. Initial diagnois of retinopathy, 2021.    Over the past two weeks, pt has been sleeping at night and been awake during the day. Goes to bed around 12am. Wakes up around 10am.        Pt ia able to feel symptoms of low bg. Feels, shaky, hot and sweaty, weak, lightheded. Usually feels symptoms in the \"90's\". Feels achy, thirsty and gets a headache with hyperglycemia.Patient Problem List reviewed for relevant medical history and current medical status.      Current Diabetes Management per Patient:  Taking diabetes medications?  yes: see below    Diabetes Medication(s)     Biguanides       metFORMIN (GLUCOPHAGE) 500 MG tablet    Take 1 tablet (500 mg) by mouth 2 times daily (with meals)     metFORMIN (GLUCOPHAGE) 500 MG tablet    Take 1 tablet (500 mg) by mouth 2 times daily (with meals)     " Patient not taking: Reported on 2022    Diabetic Other       Glucagon (BAQSIMI ONE PACK) 3 MG/DOSE POWD    Spray 1 each in nostril See Admin Instructions     glucagon (GLUCAGON EMERGENCY) 1 MG KIT    inject 1 mg as directed as needed.    Insulin       insulin glargine (LANTUS SOLOSTAR) 100 UNIT/ML pen    INJECT 29 UNITS SUB-Q DAILY ADD 2 UNITS TO PRIME PEN.     Patient not taking: Reported on 2022     Insulin Glargine-yfgn (SEMGLEE, YFGN,) 100 UNIT/ML SOPN    Inject 35 Units Subcutaneous daily Add 2 units to prime pen.     insulin lispro (HUMALOG) 100 UNIT/ML vial    INJECT 1 UNIT PER 8 GRAM CARBS. MAX DAILY DOSE  UNITS    Sodium-Glucose Co-Transporter 2 (SGLT2) Inhibitors       empagliflozin (JARDIANCE) 10 MG TABS tablet    Take 1 tablet (10 mg) by mouth daily     Patient not taking: Reported on 2022    Sulfonylureas       glimepiride (AMARYL) 2 MG tablet    Take 1 tablet (2 mg) by mouth daily (before supper)          Do you have any difficulty affording your medications or glucose monitoring supplies?  No     Patient glucose self monitoring as follows: Uses Jourdan 2 Cgms.    BG RESULTS - Per JOURDAN report:   Ave B which corresponds to a GMI of 10.2. 91% readings are above target. 6% are in target. 3% are below target. There was no discernable pattern noted in the report.     Patient admits to:  - Lack of consistency in timing of Semglee injection. Sometimes takes injection in the evening, sometimes in the morning. Sometimes forgets.   - Lack of consistency in timing of Humalog injections. Sometimes takes 15 minutes before a meal, sometimes during a meal, sometimes after a meal. Sometimes forgets.   - Lack of taking po meds (Metformin bid, Glimepiride q pm) consistently.      BG values are: Not in goal  Patient's most recent   Lab Results   Component Value Date    A1C 13.0 08/10/2022    A1C 12.4 2019    is not meeting goal of <7.0    Nutrition:  Patient eats 3 meals per day. Pt wakes  "up around 10am. Goes to be around 12am.     Breakfast -  cereal with milk or waffles or leftovers  Lunch - tacos or omelette of burger   Dinner - ttacos, burger or stir quezada   Snacks - cheese, nuts, carrots     Beverages: sometimes has reg soda with a meal but will cover with insulin or sparkling water or sugar free iced tea.     Physical Activity:    Not discussed today.     Diabetes Complications:  Not discussed today.    Recent health service and resource utilization related to diabetes (hyperglycemia, hypoglycemia, etc.):  None    Vitals:  There were no vitals taken for this visit.  Estimated body mass index is 24.05 kg/m  as calculated from the following:    Height as of 1/18/19: 1.722 m (5' 7.8\").    Weight as of 7/21/22: 71.3 kg (157 lb 3.2 oz).   Last 3 BP:   BP Readings from Last 3 Encounters:   07/21/22 124/86   03/15/19 110/76   01/18/19 135/80       History   Smoking Status     Never Smoker   Smokeless Tobacco     Never Used       Labs:  Lab Results   Component Value Date    A1C 13.0 08/10/2022    A1C 12.4 01/18/2019     Lab Results   Component Value Date     06/22/2014     Lab Results   Component Value Date    LDL 94 07/05/2018    LDL 86 09/02/2014     HDL Cholesterol   Date Value Ref Range Status   09/02/2014 51 >40 mg/dL Final   ]  GFR Estimate   Date Value Ref Range Status   07/05/2018 >90 >60 mL/min/1.7m2 Final     Comment:     Non  GFR Calc     GFR Estimate If Black   Date Value Ref Range Status   07/05/2018 >90 >60 mL/min/1.7m2 Final     Comment:      GFR Calc     Lab Results   Component Value Date    CR 0.77 07/05/2018       Health Beliefs and Attitudes:   Patient Activation Measure Survey Score:  CATIE Score (Last Two) 7/23/2015   CATIE Raw Score 52   Activation Score 100   CATIE Level 4       Stage of Change: PREPARATION (Decided to change - considering how)    Progress toward meeting diabetes-related behavioral goals:    GOALS % Met Goal   Healthy Eating   "   Physical Activity     Monitoring  100%   Medication Taking  50%   Problem Solving     Healthy Coping     Risk Reduction         Barriers to Learning: No barriers identified.     INTERVENTION:    Education provided today on:  Taking Medication    Opportunities for ongoing education and support in diabetes-self management were discussed.    Pt verbalized understanding of concepts discussed and recommendations provided today.       Education Materials Provided:  No new materials provided today    PLAN:  Taking Medication:   - Lack of consistency in timing of Semglee injection. Sometimes takes injection in the evening, sometimes in the morning. Sometimes forgets.   -- Patient agrees to take basal injection, daily, in the morning, around 10am.     - Lack of consistency in timing of Humalog injections. Sometimes takes 15 minutes before a meal, sometimes during a meal, sometimes after a meal. Sometimes forgets.   --Pt agrees to work on taking Humalog injection consistently about 15 minutes before eating.    - Lack of taking po meds (Metformin bid, Glimepiride q pm) consistently.  -- Pt agrees to purchase a pill box and keep it on his bathroom counter to help him remember to take po meds as prescribed.     - Pt's abdomen palpated for scar tissue. Cde was able to palpate hardened tissue on both sides of umbilicus. Pt advised to move injection site approximately two inches away from hardened tissue. Pt knows not to inject within two inches of belly button.     Pt states he has had two occurances with the Jourdan sensor falling off prior to the two week period.   Pt was given Skin Tac and Tac Away wipes and shown how to use them.     AVS printed and provided to patient today.    FOLLOW-UP:  Ongoing plan for education and support: Pt will return in 3 weeks for Bg and Medication review.     Time Spent: 45 minutes  Encounter Type: Individual    Any diabetes medication dose changes were made via the CDE Protocol and Collaborative  Practice Agreement with the patient's endocrinology provider. A copy of this encounter was shared with the provider.    Kain Varela comes into clinic today at the request of Dr Atkins, Ordering Provider for Pt Teaching and bg review.     This service provided today was under the supervising provider of the day Dr Campos, who was available if needed.    Ghazal Grant RN, BSN, [facility]   Certified Diabetes Care/  [facility]

## 2022-08-31 NOTE — PATIENT INSTRUCTIONS
Continue to take 35 units of Semglee daily, at about the same time (10am-nicole).  Continue to use the same meal ratio (1:10 )and the same sliding scale for food and elevated bg levels (1u:40 > 140).  Remember to take Humalog around 15 minutes before eating.   4.   Move further out to the sides of your abdomen when injecting.   5.  Purchase a pill box.    Ghazal Grant RN, BSN, [facility]   Certified Diabetes Care/  [facility]

## 2022-09-01 ENCOUNTER — TELEPHONE (OUTPATIENT)
Dept: ENDOCRINOLOGY | Facility: CLINIC | Age: 27
End: 2022-09-01

## 2022-09-28 ENCOUNTER — LAB (OUTPATIENT)
Dept: LAB | Facility: CLINIC | Age: 27
End: 2022-09-28
Payer: COMMERCIAL

## 2022-09-28 ENCOUNTER — OFFICE VISIT (OUTPATIENT)
Dept: ENDOCRINOLOGY | Facility: CLINIC | Age: 27
End: 2022-09-28
Payer: COMMERCIAL

## 2022-09-28 DIAGNOSIS — E10.9 TYPE 1 DIABETES MELLITUS WITHOUT COMPLICATION (H): ICD-10-CM

## 2022-09-28 DIAGNOSIS — E10.319 TYPE 1 DIABETES MELLITUS WITH RETINOPATHY OF BOTH EYES, MACULAR EDEMA PRESENCE UNSPECIFIED, UNSPECIFIED RETINOPATHY SEVERITY (H): Primary | ICD-10-CM

## 2022-09-28 LAB
ALBUMIN SERPL-MCNC: 4 G/DL (ref 3.4–5)
ALP SERPL-CCNC: 52 U/L (ref 40–150)
ALT SERPL W P-5'-P-CCNC: 20 U/L (ref 0–70)
ANION GAP SERPL CALCULATED.3IONS-SCNC: 2 MMOL/L (ref 3–14)
AST SERPL W P-5'-P-CCNC: 7 U/L (ref 0–45)
BILIRUB SERPL-MCNC: 0.6 MG/DL (ref 0.2–1.3)
BUN SERPL-MCNC: 10 MG/DL (ref 7–30)
CALCIUM SERPL-MCNC: 9.2 MG/DL (ref 8.5–10.1)
CHLORIDE BLD-SCNC: 108 MMOL/L (ref 94–109)
CHOLEST SERPL-MCNC: 159 MG/DL
CO2 SERPL-SCNC: 31 MMOL/L (ref 20–32)
CREAT SERPL-MCNC: 0.76 MG/DL (ref 0.66–1.25)
CREAT UR-MCNC: 30 MG/DL
FASTING STATUS PATIENT QL REPORTED: ABNORMAL
GFR SERPL CREATININE-BSD FRML MDRD: >90 ML/MIN/1.73M2
GLUCOSE BLD-MCNC: 126 MG/DL (ref 70–99)
HBA1C MFR BLD: 11 % (ref 0–5.6)
HDLC SERPL-MCNC: 39 MG/DL
LDLC SERPL CALC-MCNC: 91 MG/DL
MICROALBUMIN UR-MCNC: 5 MG/L
MICROALBUMIN/CREAT UR: 16.67 MG/G CR (ref 0–17)
NONHDLC SERPL-MCNC: 120 MG/DL
POTASSIUM BLD-SCNC: 3.8 MMOL/L (ref 3.4–5.3)
PROT SERPL-MCNC: 7.2 G/DL (ref 6.8–8.8)
SODIUM SERPL-SCNC: 141 MMOL/L (ref 133–144)
T4 FREE SERPL-MCNC: 0.93 NG/DL (ref 0.76–1.46)
TRIGL SERPL-MCNC: 145 MG/DL
TSH SERPL DL<=0.005 MIU/L-ACNC: 1.14 MU/L (ref 0.4–4)

## 2022-09-28 PROCEDURE — 83036 HEMOGLOBIN GLYCOSYLATED A1C: CPT

## 2022-09-28 PROCEDURE — 84443 ASSAY THYROID STIM HORMONE: CPT

## 2022-09-28 PROCEDURE — 84439 ASSAY OF FREE THYROXINE: CPT

## 2022-09-28 PROCEDURE — 82043 UR ALBUMIN QUANTITATIVE: CPT

## 2022-09-28 PROCEDURE — 36415 COLL VENOUS BLD VENIPUNCTURE: CPT

## 2022-09-28 PROCEDURE — 80061 LIPID PANEL: CPT

## 2022-09-28 PROCEDURE — 80053 COMPREHEN METABOLIC PANEL: CPT

## 2022-09-28 NOTE — NURSING NOTE
Patient came in for meter download.  Downloaded and printed emailed to provider for virtual visit this Friday.     Terra Brooks on 9/28/2022 at 10:13 AM

## 2022-09-28 NOTE — PROGRESS NOTES
Outcome for 09/28/22 11:57 AM: Patient went to clinic for jourdan upload. Per patient's chart, clinic uploaded and emailed jourdan to provider.   CLIFTON Nguyen    Kain Varela  is being evaluated via a billable video visit.      How would you like to obtain your AVS? MyChart  For the video visit, send the invitation by: Text to cell phone: [phone]   Will anyone else be joining your video visit? No      Outcome for 07/27/22 1:04 PM: Patient is not checking blood sugars  CLIFTON Gamboa    Video visit  Start: 10:30 am  End  11:00 am  [facility]                                                                               - Endocrinology Follow up -    Reason for visit/consult:  Type 1 diabetes mellitus without complication (H)    Primary care provider: Amy Singh    Assessment and Plan  27 year old male with DM1, irregular life style, elevated A1c,    # DM1 poor control and complicated with retinal issue and significant loss of eye sight on the left eye    Poor compliance, A1C has been more than 10 since diagnosed. Not checking glucose, currently seen by ophthalmology for retinal issue and requiring urgent surgery     - continue Jourdan    - reduce long acting insulin from 35 units to 32 unit since he occasionally experiences low in the morning 80s.     - Continue Novolog 1 unit: 8 gram carb  -Correction   Glucose more than 200 mg/dl  Correct 1 units per 50 mg/dl     - increase glimepiride 2 mg from daily to BID ( 4 am and evening)    - Metformin resume twice daily    - add on Jardiance 10 mg daily     - Nayeli follow up 2-3 weeks      -Return to clinic with Nayeli in 2 week and me in 3 month        35  minutes spent on the date of the encounter doing chart review, history and exam, documentation , phone call to outside provider, and further activities as noted above.    Zonia Atkins MD  Staff Physician  Endocrinology and Metabolism  [facility]  License: [id number]  Pager:  [phone]    Interval History as of 2022 : Patient has been doing well. Medication compliance: good but missing am dose of oral medication, he said he is waking up 4 am. New event includes: left eye waiting for the surgery, right eye laser therapy currently  .  Interval History as of 2022 :Poor compliance, A1C has been more than 10 since diagnosed. Not checking glucose, currently seen by ophthalmology for retinal issue and requiring urgent surgery. We had a call from provider today and put him urgent slot.   Interval History as of 2021 : Patient has been doing well. Last  A1C 12.4 1 year ago. persistently 2 digits at least past 4 years  Doing 12 hours night shift. Not checking glucose, not taking jardiance.   Interval History: Started to chelly freestyle continuous glucose monitoring found out his correction was too high, he had a few episodes of glucose 500 then overcorrected went to hypoglycemia.   Discussed with patient we will start new correction.    HPI: A 24 yo male here for the evaluation of type 1 diabetes.  Patient was diagnosed type 1 diabetes at age 6 with a symptom of significant weight loss dehydration polyuria polydipsia.  Patient has been on insulin since then never had DKA no hypoglycemic episode which required ambulance.  Patient switched to provider many times due to switching his parents insurance.   Last A1C 10.4 8 month agao.   He currently working in a factory as a night shift, if he forgets NovoLog twice a week, forget basilar approximately once a months.       Current Regimens:  Basaglar: 32 units at [facility]  Novolo unit: 8 g   Glucose more than 200 mg/dl  Correct 1 units per 50 mg/dl  CGM - chelly free style   Metofrmin 500 mg BID  Glimepiride 2 mg BID  Jardiance 10 mg daily     Life Style:  Wake up 4-5 pm   Breakfast banana, granola bar at work  Dinner  Burger, tacos, to go fast foods      Exercise:  Sometimes biking     DM complications:  Retinopathy: 2 years, need  "apotiemtn   Nephropathy:   Neuropathy: no  Most recent LDL:   LDL Cholesterol Calculated   Date Value Ref Range Status   09/28/2022 91 <=100 mg/dL Final   09/02/2014 86 0 - 129 mg/dL Final     Comment:     LDL Cholesterol is the primary guide to therapy: LDL-cholesterol goal in high   risk patients is <100 mg/dL and in very high risk patients is <70 mg/dL.       LDL Cholesterol Direct   Date Value Ref Range Status   07/05/2018 94 <100 mg/dL Final     Comment:     Desirable:       <100 mg/dl     Continuous glucose monitoring: not available today        Past Medical/Surgical History:  Past Medical History:   Diagnosis Date     Type 1 diabetes mellitus (H)    anxiety depression  Past Surgical History:   Procedure Laterality Date     NO HISTORY OF SURGERY         Allergies:  No Known Allergies    Current Medications   Current Outpatient Medications   Medication     blood glucose (NO BRAND SPECIFIED) lancets standard     blood glucose (NO BRAND SPECIFIED) test strip     blood glucose monitoring (NO BRAND SPECIFIED) meter device kit     Continuous Blood Gluc  (FREESTYLE CADEN 2 READER) MERCEDES     Continuous Blood Gluc Sensor (FREESTYLE CADEN 2 SENSOR) MISC     fluticasone (FLONASE) 50 MCG/ACT spray     glimepiride (AMARYL) 2 MG tablet     Glucagon (BAQSIMI ONE PACK) 3 MG/DOSE POWD     Insulin Glargine-yfgn (SEMGLEE, YFGN,) 100 UNIT/ML SOPN     insulin lispro (HUMALOG) 100 UNIT/ML vial     insulin pen needle (32G X 4 MM) 32G X 4 MM miscellaneous     insulin syringe-needle U-100 (BD INSULIN SYRINGE ULTRAFINE) 31G X 5/16\" 0.5 ML miscellaneous     metFORMIN (GLUCOPHAGE) 500 MG tablet     ACE/ARB/ARNI NOT PRESCRIBED, INTENTIONAL,     Biotin 2500 MCG CAPS     Continuous Blood Gluc  (FREESTYLE CADEN 14 DAY READER) MERCEDES     Continuous Blood Gluc Sensor (FREESTYLE CADEN 14 DAY SENSOR) MISC     empagliflozin (JARDIANCE) 10 MG TABS tablet     finasteride (PROPECIA) 1 MG tablet     FLUoxetine (PROZAC) 20 MG capsule     " glucagon (GLUCAGON EMERGENCY) 1 MG KIT     insulin glargine (LANTUS SOLOSTAR) 100 UNIT/ML pen     metFORMIN (GLUCOPHAGE) 500 MG tablet     montelukast (SINGULAIR) 10 MG tablet     No current facility-administered medications for this visit.       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     ROS:  Full review of systems taken with the help of the intake sheet. Otherwise a complete 14 point review of systems was taken and is negative unless stated in the history above.      Physical Exam:   There were no vitals taken for this visit.    General: well appearing, no acute distress, pleasant and conversant,   Mental Status/neuro: alert and oriented  Face: symmetrical, normal facial color  Eyes: anicteric, no proptosis or lid lag, per patient, unable to see left sight.   Resp: normally breathing      Labs : I reviewed data from epic and extract and summarize the pertinent data here.   Lab Results   Component Value Date     06/22/2014      Lab Results   Component Value Date    POTASSIUM 3.9 06/22/2014     Lab Results   Component Value Date    CHLORIDE 106 06/22/2014     Lab Results   Component Value Date    STEFFI 9.8 06/22/2014     Lab Results   Component Value Date    CO2 23 06/22/2014     Lab Results   Component Value Date    BUN 9 06/22/2014     Lab Results   Component Value Date    CR 0.77 07/05/2018     Lab Results   Component Value Date     06/22/2014     Lab Results   Component Value Date    TSH 1.76 07/05/2018     Lab Results   Component Value Date    T4 1.04 09/02/2014     Lab Results   Component Value Date    A1C 12.4 01/18/2019       Addendum     I was paged form  his ophthalmologist for the potential surgery since still retinal detachment.   We know his A1C has been persistently high and there is some compliance issue however this has been discussed many time, after discussion with Dr. Ordoñez, I agreed to proceed surgery (in 1 month per Dr. Ordoñez) and I will communicate with Ghazal for close follow up.     Zonia Atkins MD

## 2022-09-30 ENCOUNTER — VIRTUAL VISIT (OUTPATIENT)
Dept: ENDOCRINOLOGY | Facility: CLINIC | Age: 27
End: 2022-09-30
Payer: COMMERCIAL

## 2022-09-30 DIAGNOSIS — E10.319 TYPE 1 DIABETES MELLITUS WITH RETINOPATHY OF BOTH EYES, MACULAR EDEMA PRESENCE UNSPECIFIED, UNSPECIFIED RETINOPATHY SEVERITY (H): ICD-10-CM

## 2022-09-30 DIAGNOSIS — E10.65 TYPE 1 DIABETES MELLITUS WITH HYPERGLYCEMIA (H): Primary | ICD-10-CM

## 2022-09-30 PROCEDURE — 99214 OFFICE O/P EST MOD 30 MIN: CPT | Mod: 95 | Performed by: INTERNAL MEDICINE

## 2022-09-30 RX ORDER — INSULIN GLARGINE-YFGN 100 [IU]/ML
32 INJECTION, SOLUTION SUBCUTANEOUS DAILY
Qty: 45 ML | Refills: 2 | Status: SHIPPED | OUTPATIENT
Start: 2022-09-30

## 2022-09-30 RX ORDER — GLIMEPIRIDE 2 MG/1
2 TABLET ORAL 2 TIMES DAILY
Qty: 180 TABLET | Refills: 3 | Status: SHIPPED | OUTPATIENT
Start: 2022-09-30

## 2022-09-30 ASSESSMENT — PATIENT HEALTH QUESTIONNAIRE - PHQ9: SUM OF ALL RESPONSES TO PHQ QUESTIONS 1-9: 6

## 2022-09-30 NOTE — PROGRESS NOTES
Kain Hastingson  is being evaluated via a billable video visit.      How would you like to obtain your AVS? Vamo  For the video visit, send the invitation by: Text to cell phone: [phone]  Will anyone else be joining your video visit? No

## 2022-09-30 NOTE — NURSING NOTE
Reviewed allergies and medications with patient.    Patient states they are in [place] for today's virtual visit.     Cathy Prajapati, [facility]

## 2022-09-30 NOTE — LETTER
9/30/2022         RE: Kain Varela  [address]  [place] [address]        Dear Colleague,    Thank you for referring your patient, Kain Varela, to the [facility]. Please see a copy of my visit note below.    Outcome for 09/28/22 11:57 AM: Patient went to clinic for jourdan upload. Per patient's chart, clinic uploaded and emailed jourdan to provider.   CLIFTON Nguyen    Kain Varela  is being evaluated via a billable video visit.      How would you like to obtain your AVS? MyChart  For the video visit, send the invitation by: Text to cell phone: [phone]   Will anyone else be joining your video visit? No      Outcome for 07/27/22 1:04 PM: Patient is not checking blood sugars  CLIFTON Gamboa    Video visit  Start: 10:30 am  End  11:00 am  [facility]                                                                               - Endocrinology Follow up -    Reason for visit/consult:  Type 1 diabetes mellitus without complication (H)    Primary care provider: Amy Singh    Assessment and Plan  27 year old male with DM1, irregular life style, elevated A1c,    # DM1 poor control and complicated with retinal issue and significant loss of eye sight on the left eye    Poor compliance, A1C has been more than 10 since diagnosed. Not checking glucose, currently seen by ophthalmology for retinal issue and requiring urgent surgery     - continue Jourdan    - reduce long acting insulin from 35 units to 32 unit since he occasionally experiences low in the morning 80s.     - Continue Novolog 1 unit: 8 gram carb  -Correction   Glucose more than 200 mg/dl  Correct 1 units per 50 mg/dl     - increase glimepiride 2 mg from daily to BID ( 4 am and evening)    - Metformin resume twice daily    - add on Jardiance 10 mg daily     - Nayeli follow up 2-3 weeks      -Return to clinic with Nayeli in 2 week and me in 3 month        35  minutes spent on the date of the  encounter doing chart review, history and exam, documentation , phone call to outside provider, and further activities as noted above.    Zonia Atkins MD  Staff Physician  Endocrinology and Metabolism  [facility]  License: [id number]  Pager: [phone]    Interval History as of 2022 : Patient has been doing well. Medication compliance: good but missing am dose of oral medication, he said he is waking up 4 am. New event includes: left eye waiting for the surgery, right eye laser therapy currently  .  Interval History as of 2022 :Poor compliance, A1C has been more than 10 since diagnosed. Not checking glucose, currently seen by ophthalmology for retinal issue and requiring urgent surgery. We had a call from provider today and put him urgent slot.   Interval History as of 2021 : Patient has been doing well. Last  A1C 12.4 1 year ago. persistently 2 digits at least past 4 years  Doing 12 hours night shift. Not checking glucose, not taking jardiance.   Interval History: Started to chelly freestyle continuous glucose monitoring found out his correction was too high, he had a few episodes of glucose 500 then overcorrected went to hypoglycemia.   Discussed with patient we will start new correction.    HPI: A 24 yo male here for the evaluation of type 1 diabetes.  Patient was diagnosed type 1 diabetes at age 6 with a symptom of significant weight loss dehydration polyuria polydipsia.  Patient has been on insulin since then never had DKA no hypoglycemic episode which required ambulance.  Patient switched to provider many times due to switching his parents insurance.   Last A1C 10.4 8 month agao.   He currently working in a factory as a night shift, if he forgets NovoLog twice a week, forget basilar approximately once a months.       Current Regimens:  Basaglar: 32 units at [facility]  Novolo unit: 8 g   Glucose more than 200 mg/dl  Correct 1 units per 50 mg/dl  CGM - chelly free style  "  Metofrmin 500 mg BID  Glimepiride 2 mg BID  Jardiance 10 mg daily     Life Style:  Wake up 4-5 pm   Breakfast banana, granola bar at work  Dinner  james Webb, to go fast foods      Exercise:  Sometimes biking     DM complications:  Retinopathy: 2 years, need apotiemtn   Nephropathy:   Neuropathy: no  Most recent LDL:   LDL Cholesterol Calculated   Date Value Ref Range Status   09/28/2022 91 <=100 mg/dL Final   09/02/2014 86 0 - 129 mg/dL Final     Comment:     LDL Cholesterol is the primary guide to therapy: LDL-cholesterol goal in high   risk patients is <100 mg/dL and in very high risk patients is <70 mg/dL.       LDL Cholesterol Direct   Date Value Ref Range Status   07/05/2018 94 <100 mg/dL Final     Comment:     Desirable:       <100 mg/dl     Continuous glucose monitoring: not available today        Past Medical/Surgical History:  Past Medical History:   Diagnosis Date     Type 1 diabetes mellitus (H)    anxiety depression  Past Surgical History:   Procedure Laterality Date     NO HISTORY OF SURGERY         Allergies:  No Known Allergies    Current Medications   Current Outpatient Medications   Medication     blood glucose (NO BRAND SPECIFIED) lancets standard     blood glucose (NO BRAND SPECIFIED) test strip     blood glucose monitoring (NO BRAND SPECIFIED) meter device kit     Continuous Blood Gluc  (FREESTYLE CADEN 2 READER) MERCEDES     Continuous Blood Gluc Sensor (FREESTYLE CADEN 2 SENSOR) MISC     fluticasone (FLONASE) 50 MCG/ACT spray     glimepiride (AMARYL) 2 MG tablet     Glucagon (BAQSIMI ONE PACK) 3 MG/DOSE POWD     Insulin Glargine-yfgn (SEMGLEE, YFGN,) 100 UNIT/ML SOPN     insulin lispro (HUMALOG) 100 UNIT/ML vial     insulin pen needle (32G X 4 MM) 32G X 4 MM miscellaneous     insulin syringe-needle U-100 (BD INSULIN SYRINGE ULTRAFINE) 31G X 5/16\" 0.5 ML miscellaneous     metFORMIN (GLUCOPHAGE) 500 MG tablet     ACE/ARB/ARNI NOT PRESCRIBED, INTENTIONAL,     Biotin 2500 MCG CAPS     " Continuous Blood Gluc  (FREESTYLE CADEN 14 DAY READER) MERCEDES     Continuous Blood Gluc Sensor (FREESTYLE CADEN 14 DAY SENSOR) MISC     empagliflozin (JARDIANCE) 10 MG TABS tablet     finasteride (PROPECIA) 1 MG tablet     FLUoxetine (PROZAC) 20 MG capsule     glucagon (GLUCAGON EMERGENCY) 1 MG KIT     insulin glargine (LANTUS SOLOSTAR) 100 UNIT/ML pen     metFORMIN (GLUCOPHAGE) 500 MG tablet     montelukast (SINGULAIR) 10 MG tablet     No current facility-administered medications for this visit.       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     ROS:  Full review of systems taken with the help of the intake sheet. Otherwise a complete 14 point review of systems was taken and is negative unless stated in the history above.      Physical Exam:   There were no vitals taken for this visit.    General: well appearing, no acute distress, pleasant and conversant,   Mental Status/neuro: alert and oriented  Face: symmetrical, normal facial color  Eyes: anicteric, no proptosis or lid lag, per patient, unable to see left sight.   Resp: normally breathing      Labs : I reviewed data from epic and extract and summarize the pertinent data here.   Lab Results   Component Value Date     06/22/2014      Lab Results   Component Value Date    POTASSIUM 3.9 06/22/2014     Lab Results   Component Value Date    CHLORIDE 106 06/22/2014     Lab Results   Component Value Date    STEFFI 9.8 06/22/2014     Lab Results   Component Value Date    CO2 23 06/22/2014     Lab Results   Component Value Date    BUN 9 06/22/2014     Lab Results   Component Value Date    CR 0.77 07/05/2018     Lab Results    Component Value Date     06/22/2014     Lab Results   Component Value Date    TSH 1.76 07/05/2018     Lab Results   Component Value Date    T4 1.04 09/02/2014     Lab Results   Component Value Date    A1C 12.4 01/18/2019             Kain Varela  is being evaluated via a billable video visit.      How would you like to obtain your AVS? MyChart  For the video visit, send the invitation by: Text to cell phone: [phone]  Will anyone else be joining your video visit? No          Opened by error.       Again, thank you for allowing me to participate in the care of your patient.        Sincerely,        Zoina Atkins MD

## 2022-10-04 ENCOUNTER — TELEPHONE (OUTPATIENT)
Dept: ENDOCRINOLOGY | Facility: CLINIC | Age: 27
End: 2022-10-04

## 2022-10-04 ENCOUNTER — TELEPHONE (OUTPATIENT)
Dept: EDUCATION SERVICES | Facility: CLINIC | Age: 27
End: 2022-10-04

## 2022-10-04 ENCOUNTER — MYC MEDICAL ADVICE (OUTPATIENT)
Dept: EDUCATION SERVICES | Facility: CLINIC | Age: 27
End: 2022-10-04

## 2022-10-04 DIAGNOSIS — E10.65 TYPE 1 DIABETES MELLITUS WITH HYPERGLYCEMIA (H): ICD-10-CM

## 2022-10-04 NOTE — TELEPHONE ENCOUNTER
Will forward to HIM for release of records.    Krystyna Yuen, WALLACE  Adult Endocrinology  [facility] [place]

## 2022-10-04 NOTE — TELEPHONE ENCOUNTER
See [email or web address] message sent to [facility] regarding appts.    Nayeli Galdamez RN, [facility]

## 2022-10-04 NOTE — TELEPHONE ENCOUNTER
[facility] Call Center    Phone Message    May a detailed message be left on voicemail: yes     Reason for Call: Other: Pt called stating his short term disability officer is needing visitation notes to be faxed to [phone]. Please follow up. Thank you     Action Taken: Message routed to:  Other: endo    Travel Screening: Not Applicable

## 2022-10-04 NOTE — TELEPHONE ENCOUNTER
----- Message from Zonia Atkins MD sent at 10/3/2022  5:09 PM CDT -----  Regarding: RE: need your help  Yes!  ----- Message -----  From: Olga Galdamez, RN  Sent: 10/3/2022   9:48 AM CDT  To: Zonia Atkins MD  Subject: RE: need your help                               Dr. Wilbert Causey.  I do not have any availability in 2-3 weeks.  Ok to have Ghazal Blue see Kain?  Nayeli  ----- Message -----  From: Zonia Atkins MD  Sent: 9/30/2022  11:13 AM CDT  To: Olga Galdamez, RN  Subject: need your help                                   Padilla Tyler    Could you touch base with him?  Here is my change today. His eye is getting worse and now is the critical point.     - reduce long acting insulin from 35 units to 32 unit since he occasionally experiences low in the morning 80s.     - glimepiride 2 mg from daily to BID ( 4 am and evening)    - Metformin resume twice daily    - add on Jardiance 10 mg daily     - Nayeli follow up 2-3 weeks    - me in 3 month

## 2022-10-07 NOTE — TELEPHONE ENCOUNTER
----- Message from Olga Galdamez RN sent at 10/4/2022  8:52 AM CDT -----  Regarding: RE: need your help  I tried to catch Kain before he went to bed but no luck and his mailbox is not set up to leave a message.  Can you try and reach out to him around 4 pm and schedule 2 week appt. See Dr.Takako Atkins note below.  Thanks!  Nayeli  ----- Message -----  From: Zonia Atkins MD  Sent: 10/3/2022   5:09 PM CDT  To: Olga Galdamez RN  Subject: RE: need your help                               Yes!  ----- Message -----  From: Olga Galdamez RN  Sent: 10/3/2022   9:48 AM CDT  To: Zonia Atkins MD  Subject: RE: need your help                               Hi, Dr. Atkins.  I do not have any availability in 2-3 weeks.  Ok to have Ghazal Blue see Kain?  Nayeli  ----- Message -----  From: Zonia Atkins MD  Sent: 9/30/2022  11:13 AM CDT  To: Olga Galdamez RN  Subject: need your help                                   Padilla Tyler    Could you touch base with him?  Here is my change today. His eye is getting worse and now is the critical point.     - reduce long acting insulin from 35 units to 32 unit since he occasionally experiences low in the morning 80s.     - glimepiride 2 mg from daily to BID ( 4 am and evening)    - Metformin resume twice daily    - add on Jardiance 10 mg daily     - Nayeli follow up 2-3 weeks    - me in 3 month

## 2022-10-07 NOTE — TELEPHONE ENCOUNTER
Appointment scheduled for Oct 14th with Cde.    Ghazal Grant RN, BSN, [facility]   Certified Diabetes Care/  [facility]

## 2022-10-10 DIAGNOSIS — E10.65 TYPE 1 DIABETES MELLITUS WITH HYPERGLYCEMIA (H): Primary | ICD-10-CM

## 2022-10-10 RX ORDER — GLIMEPIRIDE 1 MG/1
1 TABLET ORAL
Qty: 90 TABLET | Refills: 1 | Status: SHIPPED | OUTPATIENT
Start: 2022-10-10 | End: 2023-05-08

## 2022-10-10 RX ORDER — GLIMEPIRIDE 1 MG/1
1 TABLET ORAL
Qty: 90 TABLET | Refills: 3 | Status: SHIPPED | OUTPATIENT
Start: 2022-10-10 | End: 2023-05-08

## 2022-10-10 NOTE — PROGRESS NOTES
Jardiance $ >300 copay, we switched to meftormin 500 mg BID and glimepiride 1 mg daily    Zonia Atkins MD

## 2022-10-14 ENCOUNTER — ALLIED HEALTH/NURSE VISIT (OUTPATIENT)
Dept: EDUCATION SERVICES | Facility: CLINIC | Age: 27
End: 2022-10-14
Payer: COMMERCIAL

## 2022-10-14 DIAGNOSIS — E10.65 TYPE 1 DIABETES MELLITUS WITH HYPERGLYCEMIA (H): Primary | ICD-10-CM

## 2022-10-14 PROCEDURE — G0108 DIAB MANAGE TRN  PER INDIV: HCPCS

## 2022-10-14 RX ORDER — BOLUS INSULIN PUMP, 200 UNIT 2 UNIT
EACH MISCELLANEOUS
Qty: 10 EACH | Refills: 6 | Status: SHIPPED | OUTPATIENT
Start: 2022-10-14

## 2022-10-14 RX ORDER — INSULIN DEGLUDEC 100 U/ML
INJECTION, SOLUTION SUBCUTANEOUS
Qty: 15 ML | Refills: 3 | Status: SHIPPED | OUTPATIENT
Start: 2022-10-14 | End: 2023-05-16

## 2022-10-14 NOTE — PROGRESS NOTES
"Diabetes Self-Management Education & Support    Presents for: Follow-up    Type of Service: In Person Visit    Assessment Type:   ASSESSMENT:  Pt returns to clinic for Bg/Medication review. Glimepiride and Metformin were added to the pt's insulin regimen in July, Pt admits to not taking the evening dose of both meds conistently. Jardiance was added in Sept.  Pt picked up the prescription whic cost $300.00 for 90 days, but has decided to not take the med due to side effects listed, primarily, increased possibility of DKA. Pt was given the customer service f phone number for [employer] and advised to call to see if he can be refunded his $300.00.           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 Surgery is postponed until bg levels improve. Initial diagnois of retinopathy, .     Over the past several weeks, pt has been sleeping at night and been awake during the day. Goes to bed around 9pm. Wakes up around 5am.        Pt ia able to feel symptoms of low bg. Feels, shaky, hot and sweaty, weak, lightheded. Usually feels symptoms in the \"90's\". Feels achy, thirsty and gets a headache with hyperglycemia.Patient Problem List reviewed for relevant medical history and current medical status.    CADEN Cgms downloaded and reviewed.   Ave B which tranfers to a GMI of 9.5.   76% readings are above target. 24% in target. 0% below target.   No consistent pattern noted of hyper or hypoglycemia.      Patient's most recent   Lab Results   Component Value Date    A1C 11.0 2022    A1C 12.4 2019    HEMOGLOBINA1 >14.0 2008     is not meeting goal of <7.0    Diabetes knowledge and skills assessment:   Patient is knowledgeable in diabetes management concepts related to: Monitoring and Taking Medication    Continue education with the following diabetes management concepts: Reducing " "Risks and Healthy Coping    Based on learning assessment above, most appropriate setting for further diabetes education would be: Individual setting.      PLAN  - Continue to take 32 units Semglee daily (takes at 5am). Rx changed today to Tresiba.   - Continue to try to dose Humalog before eating.   - Change CF to 1u:40 > 140.     Follow-up: Keep appt with Endo in Nov. Cde, as needed.     See Care Plan for co-developed, patient-state behavior change goals.  AVS provided for patient today.    Education Materials Provided:  SyMynd insulin delivery system.       SUBJECTIVE/OBJECTIVE:  Presents for: Follow-up  Accompanied by: Self  Diabetes education in the past 24mo: Yes  Focus of Visit: Taking Medication  Diabetes type: Type 1  Date of diagnosis: 2002  Disease course: Worsening  How confident are you filling out medical forms by yourself:: Extremely  Diabetes management related comments/concerns: Continued hyperglycemia  Transportation concerns: No  Difficulty affording diabetes medication?: No  Difficulty affording diabetes testing supplies?: No  Other concerns:: None  Cultural Influences/Ethnic Background:  Choose not to answer      Diabetes Symptoms & Complications:  Fatigue: No  Neuropathy: No  Polydipsia: No  Polyphagia: No  Polyuria: No  Visual change: Yes (Severe retinopathy in right eye, needing surgery.)  Slow healing wounds: No  Other: No  Symptom course: Stable  Weight trend: Stable  Complications assessed today?: No  Autonomic neuropathy: No  CVA: No  Heart disease: No  Nephropathy: No  Peripheral neuropathy: No  Peripheral Vascular Disease: No  Retinopathy: Yes  Sexual dysfunction: No    Vitals:  There were no vitals taken for this visit.  Estimated body mass index is 24.05 kg/m  as calculated from the following:    Height as of 1/18/19: 1.722 m (5' 7.8\").    Weight as of 7/21/22: 71.3 kg (157 lb 3.2 oz).   Last 3 BP:   BP Readings from Last 3 Encounters:   07/21/22 124/86   03/15/19 110/76   01/18/19 " 135/80       History   Smoking Status     Never   Smokeless Tobacco     Never       Labs:  Lab Results   Component Value Date    A1C 11.0 09/28/2022    A1C 12.4 01/18/2019    HEMOGLOBINA1 >14.0 09/02/2008     Lab Results   Component Value Date     09/28/2022     06/22/2014     Lab Results   Component Value Date    LDL 91 09/28/2022    LDL 94 07/05/2018    LDL 86 09/02/2014     HDL Cholesterol   Date Value Ref Range Status   09/02/2014 51 >40 mg/dL Final     Direct Measure HDL   Date Value Ref Range Status   09/28/2022 39 (L) >=40 mg/dL Final   ]  GFR Estimate   Date Value Ref Range Status   09/28/2022 >90 >60 mL/min/1.73m2 Final     Comment:     Effective December 21, 2021 eGFRcr in adults is calculated using the 2021 CKD-EPI creatinine equation which includes age and gender (Alix hernandez al., NEJ, DOI: 10.1056/MKGFvx0674391)   07/05/2018 >90 >60 mL/min/1.7m2 Final     Comment:     Non  GFR Calc     GFR Estimate If Black   Date Value Ref Range Status   07/05/2018 >90 >60 mL/min/1.7m2 Final     Comment:      GFR Calc     Lab Results   Component Value Date    CR 0.76 09/28/2022    CR 0.77 07/05/2018       Healthy Eating:  Healthy Eating Assessed Today: No  Cultural/Confucianist diet restrictions?: No  Meal planning/habits: Avoiding sweets, Carb counting  How many times a week on average do you eat food made away from home (restaurant/take-out)?: 1  Meals include: Lunch, Dinner  Beverages: Water, Tea, Coffee  Has patient met with a dietitian in the past?: Yes    Being Active:  Being Active Assessed Today: No  Barrier to exercise: None    Monitoring:  Monitoring Assessed Today: Yes  Did patient bring glucose meter to appointment? : Yes  Blood Glucose Meter: CGM  Times checking blood sugar at home (number): 4  Times checking blood sugar at home (per): Day  Blood glucose trend: Fluctuating    Taking Medications:  Diabetes Medication(s)     Biguanides       metFORMIN (GLUCOPHAGE) 500  MG tablet    Take 1 tablet (500 mg) by mouth 2 times daily (with meals)    Diabetic Other       Glucagon (BAQSIMI ONE PACK) 3 MG/DOSE POWD    Spray 1 each in nostril See Admin Instructions     glucagon (GLUCAGON EMERGENCY) 1 MG KIT    inject 1 mg as directed as needed.     Patient not taking: Reported on 9/30/2022    Insulin       insulin degludec (TRESIBA FLEXTOUCH) 100 UNIT/ML pen    Take 32 units daily every morning.     insulin glargine (LANTUS SOLOSTAR) 100 UNIT/ML pen    INJECT 29 UNITS SUB-Q DAILY ADD 2 UNITS TO PRIME PEN.     Patient not taking: Reported on 9/30/2022     Insulin Glargine-yfgn (SEMGLEE, YFGN,) 100 UNIT/ML SOPN    Inject 32 Units Subcutaneous daily Add 2 units to prime pen.     insulin lispro (HUMALOG) 100 UNIT/ML vial    INJECT 1 UNIT PER 8 GRAM CARBS. MAX DAILY DOSE  UNITS    Sulfonylureas       glimepiride (AMARYL) 1 MG tablet    Take 1 tablet (1 mg) by mouth every morning (before breakfast)     glimepiride (AMARYL) 1 MG tablet    Take 1 tablet (1 mg) by mouth every morning (before breakfast)     glimepiride (AMARYL) 2 MG tablet    Take 1 tablet (2 mg) by mouth 2 times daily          Taking Medication Assessed Today: Yes  Current Treatments: Insulin Injections, Oral Medication (taken by mouth)  Dose schedule: Pre-breakfast, Pre-lunch, Pre-dinner, Other (takes basal insulin in the morning)  Given by: Patient  Problems taking diabetes medications regularly?: Yes  Diabetes medication side effects?: No    Problem Solving:  Problem Solving Assessed Today: No  Is the patient at risk for hypoglycemia?: Yes  Hypoglycemia Frequency: Rarely  Hypoglycemia Treatment: Glucose (tablets or gel), Juice  Medical ID: No  Does patient have glucagon emergency kit?: Yes  Is the patient at risk for DKA?: Yes  Does patient have ketone test strips?: No  Does patient have DKA prevention plan?: Yes  Does patient have severe weather/disaster plan for diabetes management?: No  Does patient have sick day plan for  diabetes management?: No    Hypoglycemia symptoms  Dizziness or Light-Headedness: Yes  Sweats: Yes  Feeling shaky: Yes    Hypoglycemia Complications  Blackouts: No  Hospitalization: No  Nocturnal hypoglycemia: No  Required assistance: No  Required glucagon injection: No  Seizures: No    Reducing Risks:  Reducing Risks Assessed Today: No  CAD Risks: Diabetes Mellitus  Has dilated eye exam at least once a year?: Yes  Sees dentist every 6 months?: No  Feet checked by healthcare provider in the last year?: Yes    Healthy Coping:  Healthy Coping Assessed Today: No  Emotional response to diabetes: Ready to learn  Informal Support system:: Spouse  Stage of change: PREPARATION (Decided to change - considering how)  Support resources: None  Patient Activation Measure Survey Score:  CATIE Score (Last Two) 7/23/2015   CATIE Raw Score 52   Activation Score 100   CATIE Level 4         Care Plan and Education Provided: Ce Qur  CeQur insulin delivery instruction. Pt started on the CeQur insulin delivery system. Pt inserted patch without issue. Advised to change patch every 3 days. Advised each click represnts 2 units bolus insulin. Advised to continue to take basal insulin injection daily as prescribed. Rx for CeQur refills sent to the pharmacy.     Time Spent: 90 minutes  Encounter Type: Individual    Any diabetes medication dose changes were made via the CDE Protocol per the patient's endocrinology provider. A copy of this encounter was shared with the provider.    Kain Varela comes into clinic today at the request of Dr Atkins,  Ordering Provider for Pt Teaching anf bg review.     This service provided today was under the supervising provider of the day Dr Atkins, who was available if needed.    Ghazal Grant, RN, BSN, [facility]   Certified Diabetes Care/  [facility]

## 2022-10-14 NOTE — PATIENT INSTRUCTIONS
Continue to take basal insulin at 32 units daily.    Continue to dose meal-time insulin about 15 minutes before starting to eat.     Change sliding scale from 1u:40 > 180 to 1u:40 > 160.     Ghazal Grant RN, BSN, [facility]   Certified Diabetes Care/  [facility]

## 2022-10-18 ENCOUNTER — TELEPHONE (OUTPATIENT)
Dept: ENDOCRINOLOGY | Facility: CLINIC | Age: 27
End: 2022-10-18

## 2022-10-18 NOTE — TELEPHONE ENCOUNTER
Writer called to check on pt to see if he was able to get a refund for Jardiance, able to start Tresiba, and able to  Rx for CeQur insulin delivery system.      Pt states he was able to get a full refund for Jardiance ($300.00). He has picked up Tresiba and has startedto taek it. He plans to go to the pharmacy today topick up Rx for CeQur. Pt advised Rx mamta cost approx $75.00/mo (10 patches, to be changed every 10 days). Pt states, for now, he is able to pay for the product.      Reminded pt of appt with Dr Atkins on 11/11/22.    Ghazal Grant, RN, BSN, [facility]   Certified Diabetes Care/  [facility]

## 2022-11-08 ENCOUNTER — TELEPHONE (OUTPATIENT)
Dept: ENDOCRINOLOGY | Facility: CLINIC | Age: 27
End: 2022-11-08

## 2022-11-08 NOTE — TELEPHONE ENCOUNTER
[facility] Call Center    Phone Message    May a detailed message be left on voicemail: yes     Reason for Call: Other: [facility] calling wondering if patient needs to be seen by MD to be cleared for procedure. Please call Leigh Ann at [phone]     Action Taken: Other: Endo     Travel Screening: Not Applicable

## 2022-11-08 NOTE — TELEPHONE ENCOUNTER
Writer contacted Leigh Ann at [facility]. Left voicemail for Leigh Ann to return call to clinic to get more details on what surgery she is referring to.     (please note patient has office visit with Dr. Atkins this Friday, 11/11/22)      Yvette Juares RN  Endocrine Care Coordinator  [facility]

## 2022-11-11 ENCOUNTER — OFFICE VISIT (OUTPATIENT)
Dept: ENDOCRINOLOGY | Facility: CLINIC | Age: 27
End: 2022-11-11
Payer: COMMERCIAL

## 2022-11-11 VITALS
WEIGHT: 177 LBS | DIASTOLIC BLOOD PRESSURE: 85 MMHG | HEART RATE: 96 BPM | SYSTOLIC BLOOD PRESSURE: 139 MMHG | BODY MASS INDEX: 27.08 KG/M2 | OXYGEN SATURATION: 96 %

## 2022-11-11 DIAGNOSIS — E10.65 TYPE 1 DIABETES MELLITUS WITH HYPERGLYCEMIA (H): ICD-10-CM

## 2022-11-11 DIAGNOSIS — Z23 NEED FOR PROPHYLACTIC VACCINATION AND INOCULATION AGAINST INFLUENZA: ICD-10-CM

## 2022-11-11 DIAGNOSIS — E10.3513 TYPE 1 DIABETES MELLITUS WITH PROLIFERATIVE RETINOPATHY OF BOTH EYES AND MACULAR EDEMA (H): Primary | ICD-10-CM

## 2022-11-11 LAB — HBA1C MFR BLD: 9.2 % (ref 4.3–?)

## 2022-11-11 PROCEDURE — 90686 IIV4 VACC NO PRSV 0.5 ML IM: CPT | Performed by: INTERNAL MEDICINE

## 2022-11-11 PROCEDURE — 99214 OFFICE O/P EST MOD 30 MIN: CPT | Mod: 25 | Performed by: INTERNAL MEDICINE

## 2022-11-11 PROCEDURE — 90471 IMMUNIZATION ADMIN: CPT | Performed by: INTERNAL MEDICINE

## 2022-11-11 PROCEDURE — 83036 HEMOGLOBIN GLYCOSYLATED A1C: CPT | Performed by: INTERNAL MEDICINE

## 2022-11-11 NOTE — TELEPHONE ENCOUNTER
Please refer to 11/11/22 Dr. Atkins progress note. Dr. Atkins provided letter to patient in clinic for eye doctor.      Yvette Juares, RN  Endocrine Care Coordinator  [facility]

## 2022-11-11 NOTE — LETTER
11/11/2022         RE: Kain Varela  [id number]   [place] [address]        Dear Colleague,    Thank you for referring your patient, Kain Varela, to the [facility]. Please see a copy of my visit note below.    Outcome for 09/28/22 11:57 AM: Patient went to clinic for jourdan upload. Per patient's chart, clinic uploaded and emailed jourdan to provider.   CLIFTON Nguyen                                                                                 - Endocrinology Follow up -    Reason for visit/consult:  Type 1 diabetes mellitus without complication (H)    Primary care provider: Amy Singh    Assessment and Plan  27 year old male with DM1, irregular life style, elevated A1c,    # DM1 poor control and complicated with retinal issue and significant loss of eye sight on the left eye    Poor compliance, A1C has been more than 10 since diagnosed. Not checking glucose, currently seen by ophthalmology for retinal issue and requiring urgent surgery     - continue Jourdan    - continue current Tresiba  32 unit daily    - Increase carb ratio from  Novolog 1 unit: 10 gram to 1: 9 gram carb  -Correction   Glucose more than 200 mg/dl  Correct 1 units per 50 mg/dl     - continue glimepiride 2 mg from daily to BID ( 4 am and evening)    - Metformin 500 resume twice daily    - add on Januvia 100 mg daily     Addendum A1C today 9.2 - wrote a letter to Dr. Ordoñez today.     -Return to clinic with Nayeli or Ghazal in 1 month and me in 4 month        35  minutes spent on the date of the encounter doing chart review, history and exam, documentation , phone call to outside provider, and further activities as noted above.    Zonia Atkins MD  Staff Physician  Endocrinology and Metabolism  [facility]  License: [id number]  Pager: [phone]    Interval History as of 11/11/2022 : Patient has been doing ok no change eyes, waiting for eye surgery next Tuesday. .  Medication compliance: improving.  Interval History as of 2022 : Patient has been doing well. Medication compliance: good but missing am dose of oral medication, he said he is waking up 4 am. New event includes: left eye waiting for the surgery, right eye laser therapy currently  .  Interval History as of 2022 :Poor compliance, A1C has been more than 10 since diagnosed. Not checking glucose, currently seen by ophthalmology for retinal issue and requiring urgent surgery. We had a call from provider today and put him urgent slot.   Interval History as of 2021 : Patient has been doing well. Last  A1C 12.4 1 year ago. persistently 2 digits at least past 4 years  Doing 12 hours night shift. Not checking glucose, not taking jardiance.   Interval History: Started to chelly freestyle continuous glucose monitoring found out his correction was too high, he had a few episodes of glucose 500 then overcorrected went to hypoglycemia.   Discussed with patient we will start new correction.    HPI: A 22 yo male here for the evaluation of type 1 diabetes.  Patient was diagnosed type 1 diabetes at age 6 with a symptom of significant weight loss dehydration polyuria polydipsia.  Patient has been on insulin since then never had DKA no hypoglycemic episode which required ambulance.  Patient switched to provider many times due to switching his parents insurance.   Last A1C 10.4 8 month agao.   He currently working in a factory as a night shift, if he forgets NovoLog twice a week, forget basilar approximately once a months.       Current Regimens:  Basaglar: 32 units at [facility]  Novolo unit: 8 g   Glucose more than 200 mg/dl  Correct 1 units per 50 mg/dl  CGM - chelly free style   Metofrmin 500 mg BID  Glimepiride 2 mg BID  Jardiance 10 mg daily     Life Style:  Wake up 4-5 pm   Breakfast banana, granola bar at work  Dinner  Burger, tacos, to go fast foods      Exercise:  Sometimes biking     DM complications:  Retinopathy: 2  "years, need apotiemtn   Nephropathy:   Neuropathy: no  Most recent LDL:   LDL Cholesterol Calculated   Date Value Ref Range Status   09/28/2022 91 <=100 mg/dL Final   09/02/2014 86 0 - 129 mg/dL Final     Comment:     LDL Cholesterol is the primary guide to therapy: LDL-cholesterol goal in high   risk patients is <100 mg/dL and in very high risk patients is <70 mg/dL.       LDL Cholesterol Direct   Date Value Ref Range Status   07/05/2018 94 <100 mg/dL Final     Comment:     Desirable:       <100 mg/dl     Continuous glucose monitoring: not available today        Past Medical/Surgical History:  Past Medical History:   Diagnosis Date     Type 1 diabetes mellitus (H)    anxiety depression  Past Surgical History:   Procedure Laterality Date     NO HISTORY OF SURGERY         Allergies:  No Known Allergies    Current Medications   Current Outpatient Medications   Medication     blood glucose (NO BRAND SPECIFIED) lancets standard     blood glucose (NO BRAND SPECIFIED) test strip     blood glucose monitoring (NO BRAND SPECIFIED) meter device kit     Continuous Blood Gluc  (FREESTYLE CADEN 2 READER) MERCEDES     finasteride (PROPECIA) 1 MG tablet     FLUoxetine (PROZAC) 20 MG capsule     fluticasone (FLONASE) 50 MCG/ACT spray     glimepiride (AMARYL) 1 MG tablet     glimepiride (AMARYL) 1 MG tablet     glimepiride (AMARYL) 2 MG tablet     Glucagon (BAQSIMI ONE PACK) 3 MG/DOSE POWD     glucagon (GLUCAGON EMERGENCY) 1 MG KIT     Injection Device for insulin (CEQUR SIMPLICITY 2U) MERCEDES     insulin degludec (TRESIBA FLEXTOUCH) 100 UNIT/ML pen     insulin glargine (LANTUS SOLOSTAR) 100 UNIT/ML pen     Insulin Glargine-yfgn (SEMGLEE, YFGN,) 100 UNIT/ML SOPN     insulin lispro (HUMALOG) 100 UNIT/ML vial     insulin pen needle (32G X 4 MM) 32G X 4 MM miscellaneous     insulin syringe-needle U-100 (BD INSULIN SYRINGE ULTRAFINE) 31G X 5/16\" 0.5 ML miscellaneous     metFORMIN (GLUCOPHAGE) 500 MG tablet     montelukast (SINGULAIR) " 10 MG tablet     ACE/ARB/ARNI NOT PRESCRIBED, INTENTIONAL,     Biotin 2500 MCG CAPS     No current facility-administered medications for this visit.       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     ROS:  Full review of systems taken with the help of the intake sheet. Otherwise a complete 14 point review of systems was taken and is negative unless stated in the history above.      Physical Exam:   Blood pressure 139/85, pulse 96, weight 80.3 kg (177 lb), SpO2 96 %.    General: well appearing, no acute distress, pleasant and conversant,   Mental Status/neuro: alert and oriented  Face: symmetrical, normal facial color  Eyes: anicteric, no proptosis or lid lag, per patient, unable to see left sight.   Resp: normally breathing      Labs : I reviewed data from epic and extract and summarize the pertinent data here.   Lab Results   Component Value Date     06/22/2014      Lab Results   Component Value Date    POTASSIUM 3.9 06/22/2014     Lab Results   Component Value Date    CHLORIDE 106 06/22/2014     Lab Results   Component Value Date    STEFFI 9.8 06/22/2014     Lab Results   Component Value Date    CO2 23 06/22/2014     Lab Results   Component Value Date    BUN 9 06/22/2014     Lab Results   Component Value Date    CR 0.77 07/05/2018     Lab Results   Component Value Date     06/22/2014     Lab Results   Component Value Date    TSH 1.76 07/05/2018     Lab Results   Component Value Date    T4 1.04 09/02/2014     Lab Results   Component Value Date    A1C 12.4 01/18/2019       Addendum     I was paged form his ophthalmologist for the potential surgery since still retinal detachment.    We know his A1C has been persistently high and there is some compliance issue however this has been discussed many time, after discussion with Dr. Ordoñez, I agreed to proceed surgery (in 1 month per Dr. Ordoñez) and I will communicate with Ghazal for close follow up.     Zonia Atkins MD      Again, thank you for allowing me to participate in the care of your patient.        Sincerely,        Zonia Atkins MD

## 2022-11-11 NOTE — PROGRESS NOTES
Outcome for 09/28/22 11:57 AM: Patient went to clinic for jourdan upload. Per patient's chart, clinic uploaded and emailed jourdan to provider.   CLIFTON Nguyen                                                                                 - Endocrinology Follow up -    Reason for visit/consult:  Type 1 diabetes mellitus without complication (H)    Primary care provider: Amy Singh    Assessment and Plan  27 year old male with DM1, irregular life style, elevated A1c,    # DM1 poor control and complicated with retinal issue and significant loss of eye sight on the left eye    Poor compliance, A1C has been more than 10 since diagnosed. Not checking glucose, currently seen by ophthalmology for retinal issue and requiring urgent surgery     - continue Jorudan    - continue current Tresiba  32 unit daily    - Increase carb ratio from  Novolog 1 unit: 10 gram to 1: 9 gram carb  -Correction   Glucose more than 200 mg/dl  Correct 1 units per 50 mg/dl     - continue glimepiride 2 mg from daily to BID ( 4 am and evening)    - Metformin 500 resume twice daily    - add on Januvia 100 mg daily     Addendum A1C today 9.2 - wrote a letter to Dr. Ordoñez today.     -Return to clinic with Nayeli or Ghazal in 1 month and me in 4 month        35  minutes spent on the date of the encounter doing chart review, history and exam, documentation , phone call to outside provider, and further activities as noted above.    Zonia Atkins MD  Staff Physician  Endocrinology and Metabolism  [facility]  License: [id number]  Pager: [phone]    Interval History as of 11/11/2022 : Patient has been doing ok no change eyes, waiting for eye surgery next Tuesday. . Medication compliance: improving.  Interval History as of 9/30/2022 : Patient has been doing well. Medication compliance: good but missing am dose of oral medication, he said he is waking up 4 am. New event includes: left eye waiting for the surgery, right eye  laser therapy currently  .  Interval History as of 2022 :Poor compliance, A1C has been more than 10 since diagnosed. Not checking glucose, currently seen by ophthalmology for retinal issue and requiring urgent surgery. We had a call from provider today and put him urgent slot.   Interval History as of 2021 : Patient has been doing well. Last  A1C 12.4 1 year ago. persistently 2 digits at least past 4 years  Doing 12 hours night shift. Not checking glucose, not taking jardiance.   Interval History: Started to chelly freestyle continuous glucose monitoring found out his correction was too high, he had a few episodes of glucose 500 then overcorrected went to hypoglycemia.   Discussed with patient we will start new correction.    HPI: A 24 yo male here for the evaluation of type 1 diabetes.  Patient was diagnosed type 1 diabetes at age 6 with a symptom of significant weight loss dehydration polyuria polydipsia.  Patient has been on insulin since then never had DKA no hypoglycemic episode which required ambulance.  Patient switched to provider many times due to switching his parents insurance.   Last A1C 10.4 8 month agao.   He currently working in a factory as a night shift, if he forgets NovoLog twice a week, forget basilar approximately once a months.       Current Regimens:  Basaglar: [facility] at [facility]  Novolo unit: 8 g   Glucose more than 200 mg/dl  Correct 1 units per 50 mg/dl  CGM - chelly free style   Metofrmin 500 mg BID  Glimepiride 2 mg BID  Jardiance 10 mg daily     Life Style:  Wake up 4-5 pm   Breakfast banana, granola bar at work  Dinner  Burger, tacos, to go fast foods      Exercise:  Sometimes biking     DM complications:  Retinopathy: 2 years, need apotiemtn   Nephropathy:   Neuropathy: no  Most recent LDL:   LDL Cholesterol Calculated   Date Value Ref Range Status   2022 91 <=100 mg/dL Final   2014 86 0 - 129 mg/dL Final     Comment:     LDL Cholesterol is the primary guide to  "therapy: LDL-cholesterol goal in high   risk patients is <100 mg/dL and in very high risk patients is <70 mg/dL.       LDL Cholesterol Direct   Date Value Ref Range Status   07/05/2018 94 <100 mg/dL Final     Comment:     Desirable:       <100 mg/dl     Continuous glucose monitoring: not available today        Past Medical/Surgical History:  Past Medical History:   Diagnosis Date     Type 1 diabetes mellitus (H)    anxiety depression  Past Surgical History:   Procedure Laterality Date     NO HISTORY OF SURGERY         Allergies:  No Known Allergies    Current Medications   Current Outpatient Medications   Medication     blood glucose (NO BRAND SPECIFIED) lancets standard     blood glucose (NO BRAND SPECIFIED) test strip     blood glucose monitoring (NO BRAND SPECIFIED) meter device kit     Continuous Blood Gluc  (FREESTYLE CADEN 2 READER) MERCEDES     finasteride (PROPECIA) 1 MG tablet     FLUoxetine (PROZAC) 20 MG capsule     fluticasone (FLONASE) 50 MCG/ACT spray     glimepiride (AMARYL) 1 MG tablet     glimepiride (AMARYL) 1 MG tablet     glimepiride (AMARYL) 2 MG tablet     Glucagon (BAQSIMI ONE PACK) 3 MG/DOSE POWD     glucagon (GLUCAGON EMERGENCY) 1 MG KIT     Injection Device for insulin (CEQUR SIMPLICITY 2U) MERCEDES     insulin degludec (TRESIBA FLEXTOUCH) 100 UNIT/ML pen     insulin glargine (LANTUS SOLOSTAR) 100 UNIT/ML pen     Insulin Glargine-yfgn (SEMGLEE, YFGN,) 100 UNIT/ML SOPN     insulin lispro (HUMALOG) 100 UNIT/ML vial     insulin pen needle (32G X 4 MM) 32G X 4 MM miscellaneous     insulin syringe-needle U-100 (BD INSULIN SYRINGE ULTRAFINE) 31G X 5/16\" 0.5 ML miscellaneous     metFORMIN (GLUCOPHAGE) 500 MG tablet     montelukast (SINGULAIR) 10 MG tablet     ACE/ARB/ARNI NOT PRESCRIBED, INTENTIONAL,     Biotin 2500 MCG CAPS     No current facility-administered medications for this visit.       Family History:  Family History   Problem Relation Age of Onset     Asthma Mother    DM: maternal " grandmother: DM2,  Sister: lactose intolerance    Social History:  Social History     Tobacco Use     Smoking status: Never     Smokeless tobacco: Never   Substance Use Topics     Alcohol use: Yes     Comment: rare    in factory, lives with parents.     ROS:  Full review of systems taken with the help of the intake sheet. Otherwise a complete 14 point review of systems was taken and is negative unless stated in the history above.      Physical Exam:   Blood pressure 139/85, pulse 96, weight 80.3 kg (177 lb), SpO2 96 %.    General: well appearing, no acute distress, pleasant and conversant,   Mental Status/neuro: alert and oriented  Face: symmetrical, normal facial color  Eyes: anicteric, no proptosis or lid lag, per patient, unable to see left sight.   Resp: normally breathing      Labs : I reviewed data from epic and extract and summarize the pertinent data here.   Lab Results   Component Value Date     06/22/2014      Lab Results   Component Value Date    POTASSIUM 3.9 06/22/2014     Lab Results   Component Value Date    CHLORIDE 106 06/22/2014     Lab Results   Component Value Date    STEFFI 9.8 06/22/2014     Lab Results   Component Value Date    CO2 23 06/22/2014     Lab Results   Component Value Date    BUN 9 06/22/2014     Lab Results   Component Value Date    CR 0.77 07/05/2018     Lab Results   Component Value Date     06/22/2014     Lab Results   Component Value Date    TSH 1.76 07/05/2018     Lab Results   Component Value Date    T4 1.04 09/02/2014     Lab Results   Component Value Date    A1C 12.4 01/18/2019       Addendum     I was paged form his ophthalmologist for the potential surgery since still retinal detachment.   We know his A1C has been persistently high and there is some compliance issue however this has been discussed many time, after discussion with Dr. Ordoñez, I agreed to proceed surgery (in 1 month per Dr. Ordoñez) and I will communicate with Ghazal for close  follow up.     Zonia Atkins MD

## 2022-11-11 NOTE — PATIENT INSTRUCTIONS
[facility]-Department of Endocrinology  Diabetes Educators:   Ghazal Grant, RN and Nayeli Galdamez RN  Clinic Nurse: MEMO Crawford  CMA's: Sohan HUSSEINN: Terra  Scheduling/Clinic phone number : [phone]   Clinic Fax: [phone]  On-Call Endocrine at the [place] (after hours/weekends): [phone] option 4    Please call the number below to schedule your labs.    [facility] [phone]   [facility] [phone]   [place] [phone]   [facility]  [phone]   [facility] [phone]   [place] [phone]   [facility]) [phone]   [facility] Walk-In Only   [place] [phone]   [place] [phone]   [place] [phone]   [place] [phone]     Please reach out to the following centers to schedule your imaging appointment:       Imaging (DEXA, CT, MRI, XRAY)    [facility] ([facility], [facility], [place]) [phone]   [facility] ([place], [place]) [phone]   [facility] ([facility]) [phone]   [facility] ([facility]) [phone]     Appointment Reminders:  * Please bring meter with for staff to download  * If you are due ONLY for an A1C, it is scheduled with the nurse and will be done in clinic. You do not need to schedule a lab appointment. Fasting is not required for an A1C.  * Refill request should be submitted to your pharmacy. They will contact clinic for approval.

## 2022-11-11 NOTE — NURSING NOTE
Kain Varela's goals for this visit include:   Chief Complaint   Patient presents with     RECHECK     Follow up diabetes     Imm/Inj     Flu Shot       [facility] - [address] [address]  [phone]  Fax [phone]      He requests these members of his care team be copied on today's visit information: Yes    PCP: Clinic - [facility]    Referring Provider:  Referred Self, MD  No address on file    /85 (BP Location: Right arm, Patient Position: Sitting, Cuff Size: Adult Regular)   Pulse 96   Wt 80.3 kg (177 lb)   SpO2 96%   BMI 27.08 kg/m      Do you need any medication refills at today's visit? No    Tiffanie Page [facility]  Adult Endocrinology  [facility]

## 2022-11-11 NOTE — LETTER
November 11, 2022      Kain Varela  [address] [place] [facility] [address]        Dear Dr. Ordoñez,    I have met the patient today and reviewed glucose, there is some improvement.   Average glucose 200 mg and A1C 9.2 (was 11.6 in 9/2022).     If you have any problems or concerns, please call myself or my nurse at the number above.    Sincerely,          Zonia Atkins MD  [facility]  Endocrinology and Diabetes Clinic

## 2022-11-14 ENCOUNTER — TELEPHONE (OUTPATIENT)
Dept: ENDOCRINOLOGY | Facility: CLINIC | Age: 27
End: 2022-11-14

## 2022-11-14 NOTE — TELEPHONE ENCOUNTER
Writer called patient and he states Dr Ordoñez's office at [facility] [place] called earlier and received the preoperative information they needed. No further action needed.    Tiffanie Page [facility]  Adult Endocrinology  [facility]

## 2022-11-14 NOTE — TELEPHONE ENCOUNTER
[facility] Call Center    Phone Message    May a detailed message be left on voicemail: yes     Reason for Call: Other: Patient needs written authorization faxed to Dr. Tafoya's office. Please fax approval note to [phone]     Action Taken: Other: Endo     Travel Screening: Not Applicable

## 2022-11-15 ENCOUNTER — PATIENT OUTREACH (OUTPATIENT)
Dept: CARE COORDINATION | Facility: CLINIC | Age: 27
End: 2022-11-15

## 2022-11-15 ENCOUNTER — TELEPHONE (OUTPATIENT)
Dept: ENDOCRINOLOGY | Facility: CLINIC | Age: 27
End: 2022-11-15

## 2022-11-15 ENCOUNTER — TELEPHONE (OUTPATIENT)
Dept: EDUCATION SERVICES | Facility: CLINIC | Age: 27
End: 2022-11-15

## 2022-11-15 NOTE — TELEPHONE ENCOUNTER
----- Message from Zonia Atkins MD sent at 11/11/2022  7:39 PM CST -----  Regarding: please follow up post eye surgery  HI     A1C improved from 11.6 to 9.2 today, He is going to eye surgery to rescue from blindness next Tuesday. Could you please follow up like 2 weeks post op then one more. I will see in 4 month     Thank you    Zonia

## 2022-11-15 NOTE — PROGRESS NOTES
[facility]/Voicemail  Specialty: Behavioral Health - Social Work Care Coordinator    Reason for Referral: Financial Support     Complex Medical Concerns/Education     Resources for Transportation    Complex Medical Concerns: Chronic Diagnosis - Use Comments    Financial Support: Other - Use Comments Complex medical care and finance concerns.      Clinical Data: Care Coordinator Outreach  Outreach attempted x 1.  Left message on patient's voicemail with call back information and requested return call.  Plan:   Care Coordinator will try to reach patient again in 1-2 business days.    NATALYA Archuleta  [place], [place], [place], [place] and [facility]  [phone]

## 2022-11-15 NOTE — TELEPHONE ENCOUNTER
Left Voicemail (1st Attempt) for the patient to call back and schedule the following:    Appointment type: return   Provider: dr. yoo   Return date: 3/11/2023   Specialty phone number: [phone]   Additonal Notes: Return in about 4 months (around 3/11/2023).    .Alcira fuller Procedure   Orthopedics, Podiatry, Sports Medicine, Ent ,Eye , Audiology, Adult Endocrine & Diabetes, Nutrition & Medication Therapy Management Specialties   [facility]

## 2022-11-15 NOTE — TELEPHONE ENCOUNTER
I attempted to reach [facility] for diabetes education appts without success and his voice mail is not set up so I sent him a Fixber message.  See Fix[employer] message.    Nayeli Galdamez RN, [facility]

## 2022-11-17 NOTE — PROGRESS NOTES
Community Health Worker Initial Outreach    CHW Initial Information Gathering:  Referral Source: PCP  Preferred Urgent Care: [facility], [phone]  Current living arrangement:: I live in a private home  Type of residence:: Private home - stairs  Community Resources: None  Supplies Currently Used at Home: Diabetic Supplies  Equipment Currently Used at Home: none  Informal Support system:: Family, Friends, Significant other  No PCP office visit in Past Year: No  Transportation means:: Regular car  CHW Additional Questions  If ED/Hospital discharge, follow-up appointment scheduled as recommended?: N/A  Medication changes made following ED/Hospital discharge?: N/A  MyChart active?: Yes  Patient sent Social Determinants of Health questionnaire?: Yes    Patient accepts CC: Yes. Patient scheduled for assessment with CC MONTSE on 11/21 at 11 am. Patient noted desire to discuss financial assistance and diabetes management. .     NATALYA Archuleta  [facility], [place], [place] and [facility]  [phone]

## 2022-11-20 ENCOUNTER — HEALTH MAINTENANCE LETTER (OUTPATIENT)
Age: 27
End: 2022-11-20

## 2022-11-21 ENCOUNTER — PATIENT OUTREACH (OUTPATIENT)
Dept: NURSING | Facility: CLINIC | Age: 27
End: 2022-11-21
Payer: COMMERCIAL

## 2022-11-21 ASSESSMENT — ACTIVITIES OF DAILY LIVING (ADL): DEPENDENT_IADLS:: INDEPENDENT

## 2022-11-21 NOTE — PROGRESS NOTES
Clinic Care Coordination Contact    Clinic Care Coordination Contact  OUTREACH    Referral Information:  Referral Source: PCP    Primary Diagnosis: Psychosocial    Chief Complaint   Patient presents with     Clinic Care Coordination - Initial     [facility] Utilization: John Vitale  Clinic Utilization  Difficulty keeping appointments:: No  Compliance Concerns: No  No-Show Concerns: No  No PCP office visit in Past Year: No  Utilization    Hospital Admissions  0             ED Visits  0             No Show Count (past year)  1                Current as of: 11/21/2022  1:39 AM              Clinical Concerns:  Current Medical Concerns:    Patient Active Problem List   Diagnosis     Type 1 diabetes mellitus (H)     Uncontrolled type 1 diabetes mellitus     Midline low back pain without sciatica     Neck pain     Arthralgia     Encounter for long-term (current) use of insulin (H)     Type 1 diabetes, HbA1c goal < 7% (H)         Current Behavioral Concerns: None    Education Provided to patient: Introduced self and role       Health Maintenance Reviewed: Due/Overdue   Health Maintenance Due   Topic Date Due     ADVANCE CARE PLANNING  Never done     Pneumococcal Vaccine: Pediatrics (0 to 5 Years) and At-Risk Patients (6 to 64 Years) (1 - PCV) Never done     YEARLY PREVENTIVE VISIT  08/25/2011     HEPATITIS C SCREENING  Never done     DTAP/TDAP/TD IMMUNIZATION (5 - Td or Tdap) 09/24/2017     COVID-19 Vaccine (3 - Booster for Pfizer series) 07/14/2021       Clinical Pathway: None    Medication Management:  Did not review medications     Functional Status:  Dependent ADLs:: Independent  Dependent IADLs:: Independent  Mobility Status: Independent    Living Situation:  Current living arrangement:: I live in a private home  Type of residence:: Private home - stairs    Lifestyle & Psychosocial Needs: [facility] contacted patient. Introduced self and role. Discussed consult reason. Patient stated that  he has been out of work for the past 5 months due to loss of vision in one of his eyes. He recently had one eye surgery and is likely needing another one, so he will be out of work for an unknown amount of time. He reports that he gets about $650/week from short term disability (before taxes). Patient is over income for MA. He stated that he already tried to apply and he was denied. Encouraged patient to contact the [facility] to see if he would qualify for food support. [facility] provided patient with the number for [facility]/Prescription Assistance through [facility]. No further resources to provide at this time.     Social Determinants of Health     Tobacco Use: Low Risk      Smoking Tobacco Use: Never     Smokeless Tobacco Use: Never     Passive Exposure: Not on file   Alcohol Use: Not on file   Financial Resource Strain: Not on file   Food Insecurity: Not on file   Transportation Needs: Not on file   Physical Activity: Not on file   Stress: Not on file   Social Connections: Not on file   Intimate Partner Violence: Not on file   Depression: At risk     PHQ-2 Score: 3   Housing Stability: Not on file        Transportation means:: Regular car     Informal Support system:: Family, Friends, Significant other      Resources and Interventions:  Current Resources:      Community Resources: None  Supplies Currently Used at Home: Diabetic Supplies  Equipment Currently Used at Home: none       Care Plan: No care plan created. Patient was not enrolled in Care Coordination       Patient/Caregiver understanding: Yes           Plan: Patient declined enrollment in Care Coordination. Encouraged patient to contact care team if any changes in his income changes, as FRW may be able to get involved at that time, depending on situation. No further questions    HARRY Paz  Primary Care Clinic- Social Work Care Coordinator  [facility]- [place], [place] and Ximena Massey  Ph: [phone]  11/21/2022 2:53 PM

## 2023-04-28 ENCOUNTER — OFFICE VISIT (OUTPATIENT)
Dept: FAMILY MEDICINE | Facility: CLINIC | Age: 28
End: 2023-04-28
Payer: COMMERCIAL

## 2023-04-28 VITALS
OXYGEN SATURATION: 98 % | WEIGHT: 177.6 LBS | TEMPERATURE: 98.5 F | HEIGHT: 68 IN | HEART RATE: 112 BPM | DIASTOLIC BLOOD PRESSURE: 85 MMHG | BODY MASS INDEX: 26.92 KG/M2 | RESPIRATION RATE: 16 BRPM | SYSTOLIC BLOOD PRESSURE: 131 MMHG

## 2023-04-28 DIAGNOSIS — R68.84 JAW PAIN: ICD-10-CM

## 2023-04-28 DIAGNOSIS — N45.1 ACUTE EPIDIDYMITIS: Primary | ICD-10-CM

## 2023-04-28 PROCEDURE — 99214 OFFICE O/P EST MOD 30 MIN: CPT | Performed by: FAMILY MEDICINE

## 2023-04-28 RX ORDER — SULFAMETHOXAZOLE/TRIMETHOPRIM 800-160 MG
1 TABLET ORAL 2 TIMES DAILY
Qty: 14 TABLET | Refills: 0 | Status: SHIPPED | OUTPATIENT
Start: 2023-04-28 | End: 2023-05-05

## 2023-04-28 RX ORDER — NABUMETONE 500 MG/1
500 TABLET, FILM COATED ORAL 2 TIMES DAILY PRN
Qty: 20 TABLET | Refills: 1 | Status: SHIPPED | OUTPATIENT
Start: 2023-04-28

## 2023-04-28 ASSESSMENT — PAIN SCALES - GENERAL: PAINLEVEL: NO PAIN (1)

## 2023-04-28 NOTE — PROGRESS NOTES
ASSESSMENT / PLAN:  (N45.1) Acute epididymitis  (primary encounter diagnosis)  Comment: likely from prolonged sitting/boxers. No std concerns per patient and no signs of acute infection  Plan: sulfamethoxazole-trimethoprim (BACTRIM DS)         800-160 MG tablet, nabumetone (RELAFEN) 500 MG         tablet        HOLD bactrim. Can take if fevers or chills or swelling/redness. If std concerns would need to go to urgent care/er/return to clinic for IM antibiotic. Urology/ultrasound if persists next couple weeks. Ice and briefs. No heavy lifting until better and spread legs at computer. Reveiwed risks and side effects of medication  Expected course and warning signs reviewed. Call/email with questions/concerns     (Y60.66) Jaw pain  Comment: likely tmj  Plan: follow-up dentist. Consider TMJ clinic. Heat and relafen short term should help.         Ghulam Finnegan is a 27 year old, presenting for the following health issues:  Testicular Problem (Left)  3 weeks. No injury. No similar issues.   No swelling/masses. No urine changes or hematuria. No fevers or chills.   No history hernia.  Work- not in past year. No heavy lifting or chnages in activity. Lots of sitting.   Some ibuprofen. No heat/ice. Boxers.  No nausea, vomiting or diarrhea or constipation.   No std concerns. No discharge concerns.   History dm - a little high sugar -working on it.       4/28/2023     6:55 AM   Additional Questions   Roomed by YANY Orellana CMA     History of Present Illness       Reason for visit:  My left Testicle is hurting starting out at severe pain with no injury, I also had some jaw pain that has primarely become pain or  within my mouth while the taste of something sweet, spicy or sour hits around the same areas  Symptom onset:  3-4 weeks ago  Symptom intensity:  Moderate  Symptom progression:  Staying the same  Had these symptoms before:  No    He eats 0-1 servings of fruits and vegetables daily.He consumes 0 sweetened beverage(s)  "daily.He exercises with enough effort to increase his heart rate 9 or less minutes per day.  He exercises with enough effort to increase his heart rate 3 or less days per week. He is missing 2 dose(s) of medications per week.  He is not taking prescribed medications regularly due to cost of medication and remembering to take.         Review of Systems         Objective    /85   Pulse 112   Temp 98.5  F (36.9  C) (Oral)   Resp 16   Ht 1.727 m (5' 8\")   Wt 80.6 kg (177 lb 9.6 oz)   SpO2 98%   BMI 27.00 kg/m    Body mass index is 27 kg/m .  Physical Exam   GENERAL: healthy, alert and no distress  HENT: ear canals and TM's normal, nose and mouth without ulcers or lesions  HENT: some tenderness right TMJ.   NECK: no adenopathy, no asymmetry, masses, or scars and thyroid normal to palpation  RESP: lungs clear to auscultation - no rales, rhonchi or wheezes  CV: regular rate and rhythm, normal S1 S2, no S3 or S4, no murmur, click or rub, no peripheral edema and peripheral pulses strong  ABDOMEN: soft, nontender, no hepatosplenomegaly, no masses and bowel sounds normal   (male): testicles normal without atrophy or masses, epididymal tenderness left. No swelling/redness/wamth. no hernias and penis normal without urethral discharge  MS: no gross musculoskeletal defects noted, no edema  SKIN: no suspicious lesions or rashes  PSYCH: mentation appears normal, affect normal/bright          "

## 2023-05-08 ENCOUNTER — OFFICE VISIT (OUTPATIENT)
Dept: FAMILY MEDICINE | Facility: CLINIC | Age: 28
End: 2023-05-08
Payer: COMMERCIAL

## 2023-05-08 ENCOUNTER — LAB (OUTPATIENT)
Dept: LAB | Facility: CLINIC | Age: 28
End: 2023-05-08
Payer: COMMERCIAL

## 2023-05-08 VITALS
RESPIRATION RATE: 14 BRPM | TEMPERATURE: 98.6 F | DIASTOLIC BLOOD PRESSURE: 80 MMHG | WEIGHT: 179.8 LBS | BODY MASS INDEX: 27.25 KG/M2 | SYSTOLIC BLOOD PRESSURE: 128 MMHG | OXYGEN SATURATION: 96 % | HEIGHT: 68 IN | HEART RATE: 118 BPM

## 2023-05-08 DIAGNOSIS — Z01.818 PREOP GENERAL PHYSICAL EXAM: ICD-10-CM

## 2023-05-08 DIAGNOSIS — E10.9 TYPE 1 DIABETES, HBA1C GOAL < 7% (H): ICD-10-CM

## 2023-05-08 DIAGNOSIS — R68.84 JAW PAIN: ICD-10-CM

## 2023-05-08 DIAGNOSIS — E10.3593 TYPE 1 DIABETES MELLITUS WITH PROLIFERATIVE RETINOPATHY OF BOTH EYES, MACULAR EDEMA PRESENCE UNSPECIFIED, UNSPECIFIED PROLIFERATIVE RETINOPATHY TYPE (H): Primary | ICD-10-CM

## 2023-05-08 LAB — HBA1C MFR BLD: 10 % (ref 0–5.6)

## 2023-05-08 PROCEDURE — 83036 HEMOGLOBIN GLYCOSYLATED A1C: CPT

## 2023-05-08 PROCEDURE — 99214 OFFICE O/P EST MOD 30 MIN: CPT | Performed by: NURSE PRACTITIONER

## 2023-05-08 PROCEDURE — 36415 COLL VENOUS BLD VENIPUNCTURE: CPT

## 2023-05-08 ASSESSMENT — PAIN SCALES - GENERAL: PAINLEVEL: MILD PAIN (2)

## 2023-05-08 NOTE — PROGRESS NOTES
[facility]  [address]  [facility] [address]  Phone: [phone]  Primary Provider: Radha Ref-Primary, Physician  Pre-op Performing Provider: PEÑA FONTAINE      PREOPERATIVE EVALUATION:  Today's date: 5/8/2023    Kain Varela is a 27 year old male who presents for a preoperative evaluation.      5/8/2023    10:02 AM   Additional Questions   Roomed by Carmela ROJAS CMA   Accompanied by self     Surgical Information:  Surgery/Procedure: follow up retina reattachment surgery- left eye surgery  Surgery Location: [facility]  Surgeon: Dr. Smith  Surgery Date: 5/9/23  Time of Surgery: 7:00am  Where patient plans to recover: At home with family  Fax number for surgical facility: [phone]    Assessment & Plan     The proposed surgical procedure is considered LOW risk.    Preop general physical exam  Preoperative exam completed today.  Hemoglobin A1c is 10%.  He is otherwise low risk for planned procedure recommend proceeding without further clinical clarification.  Advised patient to follow-up with endocrinology and establish care with a PCP closer to home.    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  Following with ophthalmology for management of retinopathy and retinal detachment postsurgical.    Type 1 diabetes, HbA1c goal < 7% (H)  Continues to be uncontrolled encouraged him to follow-up with endocrinology and establish with a new primary care provider    Jaw pain  Suspect it may be related to salivary gland stone.  Advised patient to follow-up with dentist to evaluate teeth.  Massage of the temporomandibular joint as well as the parotid gland may be helpful.  He is in agreement this plan will follow-up for further evaluation if needed.      Risks and Recommendations:  The patient has the following additional  risks and recommendations for perioperative complications:  Diabetes:  - Patient is on insulin therapy; diabetic NPO guidelines provided and discussed.    Antiplatelet or Anticoagulation Medication Instructions:   - Bleeding risk is low for this procedure (e.g. dental, skin, cataract).    Additional Medication Instructions:   - Long acting insulin (e.g. glargine, detemir): Take 80% of the usual evening or morning dose before surgery.    - short acting insulin (e.g. regular, lispro, aspart): HOLD on the morning of surgery.    - metformin: HOLD day of surgery.   - sulfonylurea (e.g. glyburide, glipizide): HOLD day of surgery   - DPP-4 Inhibitor (e.g. sitagliptin [Januvia], linagliptin [Tradjenta]): HOLD day of surgery.     RECOMMENDATION:  APPROVAL GIVEN to proceed with proposed procedure, without further diagnostic evaluation.      Subjective     HPI related to upcoming procedure: retinal detachment due to diabetic retinopathy. Successfully reattached. Follow up to have bubble removed at upcoming surgery.     He follows with endocrinology for diabetes management.     Patient states that this jaw has been hurting for 3 1/2 -4 weeks now. It is becoming to be a more consistent dull pain. He still gets a sharp pain when he eats or drinks. Right sided. He woke up with pain about 1 month ago during the day it hurt then later that day he felt a pop. Since that time he has had persistent pain. He has not seen the dentist yet. He is having some tooth pain, bottom jaw.         5/8/2023    10:00 AM   Preop Questions   1. Have you ever had a heart attack or stroke? No   2. Have you ever had surgery on your heart or blood vessels, such as a stent placement, a coronary artery bypass, or surgery on an artery in your head, neck, heart, or legs? No   3. Do you have chest pain with activity? No   4. Do you have a history of  heart failure? No   5. Do you currently have a cold, bronchitis or symptoms of other infection? UNKNOWN - See  concern of jaw above.    6. Do you have a cough, shortness of breath, or wheezing? No   7. Do you or anyone in your family have previous history of blood clots? No   8. Do you or does anyone in your family have a serious bleeding problem such as prolonged bleeding following surgeries or cuts? No   9. Have you ever had problems with anemia or been told to take iron pills? No   10. Have you had any abnormal blood loss such as black, tarry or bloody stools? No   11. Have you ever had a blood transfusion? No   12. Are you willing to have a blood transfusion if it is medically needed before, during, or after your surgery? Yes   13. Have you or any of your relatives ever had problems with anesthesia? No   14. Do you have sleep apnea, excessive snoring or daytime drowsiness? No   15. Do you have any artifical heart valves or other implanted medical devices like a pacemaker, defibrillator, or continuous glucose monitor? No   16. Do you have artificial joints? No   17. Are you allergic to latex? No       Health Care Directive:  Patient does not have a Health Care Directive or Living Will: Discussed advance care planning with patient; information given to patient to review.    Preoperative Review of :   reviewed - no record of controlled substances prescribed.      Status of Chronic Conditions:  DIABETES - Patient has a longstanding history of DiabetesType Type I . Patient is being treated with insulin injections and denies significant side effects. Control has been fair. Complicating factors include but are not limited to: retinopathy.       Review of Systems  CONSTITUTIONAL: NEGATIVE for fever, chills, change in weight  ENT/MOUTH: NEGATIVE for ear, mouth and throat problems  RESP: NEGATIVE for significant cough or SOB  CV: NEGATIVE for chest pain, palpitations or peripheral edema    Patient Active Problem List    Diagnosis Date Noted     Type 1 diabetes, HbA1c goal < 7% (H) 06/16/2016     Priority: Medium      Encounter for long-term (current) use of insulin (H) 07/26/2015     Priority: Medium     Midline low back pain without sciatica 07/16/2015     Priority: Medium     Neck pain 07/16/2015     Priority: Medium     Arthralgia 07/16/2015     Priority: Medium     Uncontrolled type 1 diabetes mellitus 01/25/2011     Priority: Medium     Problem list name updated by automated process. Provider to review       Type 1 diabetes mellitus (H) 01/25/2010     Priority: Medium     Diagnosed August 2001  Do you wish to do the replacement in the background? yes          Past Medical History:   Diagnosis Date     Type 1 diabetes mellitus (H)      Past Surgical History:   Procedure Laterality Date     NO HISTORY OF SURGERY       Current Outpatient Medications   Medication Sig Dispense Refill     blood glucose (NO BRAND SPECIFIED) lancets standard Use to test blood sugar 4 times daily or as directed. 200 each 11     blood glucose (NO BRAND SPECIFIED) test strip Use to test blood sugar 1 time daily. 50 strip 1     blood glucose monitoring (NO BRAND SPECIFIED) meter device kit Use to test blood sugar 4 times daily or as directed. 1 kit 0     Continuous Blood Gluc  (FREESTYLE CADEN 2 READER) MERCEDES 1 each continuous Either caden or libre2 fine, better coverage by his insurance would be good. Thank you. Zonia Atkins MD 1 each 0     fluticasone (FLONASE) 50 MCG/ACT spray Spray 2 sprays in nostril 2 times daily as needed       glimepiride (AMARYL) 2 MG tablet Take 1 tablet (2 mg) by mouth 2 times daily 180 tablet 3     Glucagon (BAQSIMI ONE PACK) 3 MG/DOSE POWD Spray 1 each in nostril See Admin Instructions 1 each 1     metFORMIN (GLUCOPHAGE) 500 MG tablet Take 1 tablet (500 mg) by mouth 2 times daily (with meals) 180 tablet 3     nabumetone (RELAFEN) 500 MG tablet Take 1 tablet (500 mg) by mouth 2 times daily as needed for moderate pain Take with food 20 tablet 1     sitagliptin (JANUVIA) 100 MG tablet Take 1 tablet (100 mg) by mouth  "daily 90 tablet 3     ACE/ARB/ARNI NOT PRESCRIBED, INTENTIONAL, Please choose reason not prescribed, below       finasteride (PROPECIA) 1 MG tablet Take 1 mg by mouth daily (Patient not taking: Reported on 5/8/2023)       glucagon (GLUCAGON EMERGENCY) 1 MG KIT inject 1 mg as directed as needed. (Patient not taking: Reported on 5/8/2023) 2 Kit 1     Injection Device for insulin (CEQUR SIMPLICITY 2U) MERCEDES Change patch a every 3 days. 10 each 6     insulin degludec (TRESIBA FLEXTOUCH) 100 UNIT/ML pen Take 32 units daily every morning. 15 mL 3     insulin glargine (LANTUS SOLOSTAR) 100 UNIT/ML pen INJECT 29 UNITS SUB-Q DAILY ADD 2 UNITS TO PRIME PEN. 15 mL 1     Insulin Glargine-yfgn (SEMGLEE, YFGN,) 100 UNIT/ML SOPN Inject 32 Units Subcutaneous daily Add 2 units to prime pen. 45 mL 2     insulin lispro (HUMALOG) 100 UNIT/ML vial INJECT 1 UNIT PER 8 GRAM CARBS. MAX DAILY DOSE  UNITS 90 mL 2     insulin pen needle (32G X 4 MM) 32G X 4 MM miscellaneous Use 4-5 pen needles daily or as directed. 200 each 3     insulin syringe-needle U-100 (BD INSULIN SYRINGE ULTRAFINE) 31G X 5/16\" 0.5 ML miscellaneous Use 5-7 syringes daily for insulin injections 200 each 11     montelukast (SINGULAIR) 10 MG tablet Take 10 mg by mouth daily (Patient not taking: Reported on 5/8/2023)         No Known Allergies     Social History     Tobacco Use     Smoking status: Never     Smokeless tobacco: Never   Vaping Use     Vaping status: Never Used   Substance Use Topics     Alcohol use: Yes     Comment: rare     Family History   Problem Relation Age of Onset     Asthma Mother      History   Drug Use No         Objective     /80 (BP Location: Right arm, Patient Position: Sitting, Cuff Size: Adult Regular)   Pulse 118   Temp 98.6  F (37  C) (Tympanic)   Resp 14   Ht 1.727 m (5' 8\")   Wt 81.6 kg (179 lb 12.8 oz)   SpO2 96%   BMI 27.34 kg/m      Physical Exam  GENERAL APPEARANCE: healthy, alert and no distress  HENT: ear canals and " TM's normal and nose and mouth without ulcers or lesions  RESP: lungs clear to auscultation - no rales, rhonchi or wheezes  CV: regular rate and rhythm, normal S1 S2, no S3 or S4 and no murmur, click or rub   ABDOMEN: soft, nontender, no HSM or masses and bowel sounds normal  NEURO: Normal strength and tone, sensory exam grossly normal, mentation intact and speech normal    Recent Labs   Lab Test 09/28/22  0945 08/10/22  0907     --    POTASSIUM 3.8  --    CR 0.76  --    A1C 11.0* 13.0*        Diagnostics:  Recent Results (from the past 24 hour(s))   HEMOGLOBIN A1C    Collection Time: 05/08/23 10:56 AM   Result Value Ref Range    Hemoglobin A1C 10.0 (H) 0.0 - 5.6 %      No EKG required for low risk surgery (cataract, skin procedure, breast biopsy, etc).          Revised Cardiac Risk Index (RCRI):  The patient has the following serious cardiovascular risks for perioperative complications:   - Diabetes Mellitus (on Insulin) = 1 point     RCRI Interpretation: 1 point: Class II (low risk - 0.9% complication rate)           Signed Electronically by: TANJA Simmons CNP  Copy of this evaluation report is provided to requesting physician.

## 2023-05-08 NOTE — PATIENT INSTRUCTIONS
For informational purposes only. Not to replace the advice of your health care provider. Copyright   2003,  [facility]. All rights reserved. Clinically reviewed by Mary Kulkarni MD. [employer] [id number] - REV .  Preparing for Your Surgery  Getting started  A nurse will call you to review your health history and instructions. They will give you an arrival time based on your scheduled surgery time. Please be ready to share:  Your doctor's clinic name and phone number  Your medical, surgical, and anesthesia history  A list of allergies and sensitivities  A list of medicines, including herbal treatments and over-the-counter drugs  Whether the patient has a legal guardian (ask how to send us the papers in advance)  Please tell us if you're pregnant--or if there's any chance you might be pregnant. Some surgeries may injure a fetus (unborn baby), so they require a pregnancy test. Surgeries that are safe for a fetus don't always need a test, and you can choose whether to have one.   If you have a child who's having surgery, please ask for a copy of Preparing for Your Child's Surgery.    Preparing for surgery  Within 10 to 30 days of surgery: Have a pre-op exam (sometimes called an H&P, or History and Physical). This can be done at a clinic or pre-operative center.  If you're having a , you may not need this exam. Talk to your care team.  At your pre-op exam, talk to your care team about all medicines you take. If you need to stop any medicines before surgery, ask when to start taking them again.  We do this for your safety. Many medicines can make you bleed too much during surgery. Some change how well surgery (anesthesia) drugs work.  Call your insurance company to let them know you're having surgery. (If you don't have insurance, call [phone].)  Call your clinic if there's any change in your health. This includes signs of a cold or flu (sore throat, runny nose, cough, rash, fever). It  also includes a scrape or scratch near the surgery site.  If you have questions on the day of surgery, call your hospital or surgery center.  Eating and drinking guidelines  For your safety: Unless your surgeon tells you otherwise, follow the guidelines below.  Eat and drink as usual until 8 hours before you arrive for surgery. After that, no food or milk.  Drink clear liquids until 2 hours before you arrive. These are liquids you can see through, like water, Gatorade, and Propel Water. They also include plain black coffee and tea (no cream or milk), candy, and breath mints. You can spit out gum when you arrive.  If you drink alcohol: Stop drinking it the night before surgery.  If your care team tells you to take medicine on the morning of surgery, it's okay to take it with a sip of water.  Preventing infection  Shower or bathe the night before and morning of your surgery. Follow the instructions your clinic gave you. (If no instructions, use regular soap.)  Don't shave or clip hair near your surgery site. We'll remove the hair if needed.  Don't smoke or vape the morning of surgery. You may chew nicotine gum up to 2 hours before surgery. A nicotine patch is okay.  Note: Some surgeries require you to completely quit smoking and nicotine. Check with your surgeon.  Your care team will make every effort to keep you safe from infection. We will:  Clean our hands often with soap and water (or an alcohol-based hand rub).  Clean the skin at your surgery site with a special soap that kills germs.  Give you a special gown to keep you warm. (Cold raises the risk of infection.)  Wear special hair covers, masks, gowns and gloves during surgery.  Give antibiotic medicine, if prescribed. Not all surgeries need antibiotics.  What to bring on the day of surgery  Photo ID and insurance card  Copy of your health care directive, if you have one  Glasses and hearing aids (bring cases)  You can't wear contacts during surgery  Inhaler and  eye drops, if you use them (tell us about these when you arrive)  CPAP machine or breathing device, if you use them  A few personal items, if spending the night  If you have . . .  A pacemaker, ICD (cardiac defibrillator) or other implant: Bring the ID card.  An implanted stimulator: Bring the remote control.  A legal guardian: Bring a copy of the certified (court-stamped) guardianship papers.  Please remove any jewelry, including body piercings. Leave jewelry and other valuables at home.  If you're going home the day of surgery  You must have a responsible adult drive you home. They should stay with you overnight as well.  If you don't have someone to stay with you, and you aren't safe to go home alone, we may keep you overnight. Insurance often won't pay for this.  After surgery  If it's hard to control your pain or you need more pain medicine, please call your surgeon's office.  Questions?   If you have any questions for your care team, list them here: _________________________________________________________________________________________________________________________________________________________________________ ____________________________________ ____________________________________ ____________________________________    How to Take Your Medication Before Surgery  - Take all of your medications before surgery except as noted below  Adjust insulin as needed day of surgery based on food intake.

## 2023-05-16 DIAGNOSIS — E10.65 TYPE 1 DIABETES MELLITUS WITH HYPERGLYCEMIA (H): ICD-10-CM

## 2023-05-16 NOTE — TELEPHONE ENCOUNTER
TRESIBA FLEXTOUCH 100 UNIT/ML  Last Written Prescription Date:   10/14/2022  Last Fill Quantity: 15,   # refills: 3  Last Office Visit : 11/11/2022  Future Office visit: None    Routing refill request to provider for review/approval because:  Drug not on the [facility], [facility] or [facility] refill protocol or controlled substance      Kiki Vallejo RN  Central Triage Red Flags/Med Refills

## 2023-05-17 RX ORDER — INSULIN DEGLUDEC 100 U/ML
INJECTION, SOLUTION SUBCUTANEOUS
Qty: 15 ML | Refills: 0 | Status: SHIPPED | OUTPATIENT
Start: 2023-05-17 | End: 2023-06-30

## 2023-05-17 NOTE — TELEPHONE ENCOUNTER
5/17 Called and spoke to patient, follow up appointment with Dr. Atkins has been scheduled.     Alcira fuller Procedure   Orthopedics, Podiatry, Sports Medicine, Ent ,Eye , Audiology, Adult Endocrine & Diabetes, Nutrition & Medication Therapy Management Specialties   [facility]

## 2023-05-17 NOTE — TELEPHONE ENCOUNTER
Per 11/11/22 progress note from Dr. Atkins:    # DM1 poor control and complicated with retinal issue and significant loss of eye sight on the left eye     Poor compliance, A1C has been more than 10 since diagnosed. Not checking glucose, currently seen by ophthalmology for retinal issue and requiring urgent surgery      - continue Jourdan     - continue current Tresiba  32 unit daily     - Increase carb ratio from  Novolog 1 unit: 10 gram to 1: 9 gram carb  -Correction   Glucose more than 200 mg/dl  Correct 1 units per 50 mg/dl      - continue glimepiride 2 mg from daily to BID ( 4 am and evening)     - Metformin 500 resume twice daily     - add on Januvia 100 mg daily      Addendum A1C today 9.2 - wrote a letter to Dr. Ordoñez today.      -Return to clinic with Naeyli Harman in 1 month and me in 4 month               Multiple attempts have been made to schedule with CDE team and Dr. Atkins. Writer will send in 1 refill and ask clinic scheduling team to reach out to patient again to arrange visit with CDE and Dr. Atkins or Lottie Fuentes PA-C.      Yvette Juares, RN  Endocrine Care Coordinator  [facility]

## 2023-07-18 DIAGNOSIS — E10.65 TYPE 1 DIABETES MELLITUS WITH HYPERGLYCEMIA (H): ICD-10-CM

## 2023-07-19 NOTE — TELEPHONE ENCOUNTER
TRESIBA FLEXTOUCH 100 UNIT/ML pen 15 mL 0 6/30/2023         Last Written Prescription Date:  6-  Last Fill Quantity: 15ml,   # refills: 0  Last Office Visit : 11-  Future Office visit:  8-    Routing refill request to provider for review/approval because:  Insulin - refilled per clinic      Kathleen M Doege RN

## 2023-07-20 RX ORDER — INSULIN DEGLUDEC 100 U/ML
INJECTION, SOLUTION SUBCUTANEOUS
Qty: 15 ML | Refills: 0 | Status: SHIPPED | OUTPATIENT
Start: 2023-07-20 | End: 2023-09-28

## 2023-08-09 ENCOUNTER — TELEPHONE (OUTPATIENT)
Dept: ENDOCRINOLOGY | Facility: CLINIC | Age: 28
End: 2023-08-09
Payer: COMMERCIAL

## 2023-08-09 NOTE — TELEPHONE ENCOUNTER
Attempted to reach patient for upcoming appointment; chelly data needed. No answer, LM on VM to call office back.     Appointment with Zonia Atkins MD on 8/11/23    Vivian Skaggs MA

## 2023-08-10 ENCOUNTER — TELEPHONE (OUTPATIENT)
Dept: ENDOCRINOLOGY | Facility: CLINIC | Age: 28
End: 2023-08-10
Payer: COMMERCIAL

## 2023-08-10 NOTE — TELEPHONE ENCOUNTER
Called patient and left voicemail. Patient has an appointment on  8/11/23 . Need patient to upload their Jourdan device to site for provider to review prior to their appointment.  Amairani Quigley MA

## 2023-09-28 DIAGNOSIS — E10.65 TYPE 1 DIABETES MELLITUS WITH HYPERGLYCEMIA (H): ICD-10-CM

## 2023-09-28 RX ORDER — INSULIN DEGLUDEC 100 U/ML
INJECTION, SOLUTION SUBCUTANEOUS
Qty: 15 ML | Refills: 3 | Status: SHIPPED | OUTPATIENT
Start: 2023-09-28 | End: 2024-04-18

## 2023-09-28 NOTE — TELEPHONE ENCOUNTER
TRESIBA FLEXTOUCH 100 UNIT/ML   Last Written Prescription Date:   7/20/2023  Last Fill Quantity: 15,   # refills: 0  Last Office Visit :  11/11/2022  Future Office visit:  None    Routing refill request to provider for review/approval because:  Abnormal A1C and would Provider like an updated office visit for Pt care?  Refer to Provider for review        Recent Labs   Lab Test 05/08/23  1056 11/11/22  1202   A1C 10.0*  --    HEMOGLOBINA1  --  9.2*     Kiki Vallejo RN  Central Triage Red Flags/Med Refills    Long Acting Insulin Protocol [id number]/28/2023 12:20 AM   Protocol Details HgbA1C in past 3 or 6 months    Recent (6 mo) or future (30 days) visit within the authorizing provider's specialty

## 2023-11-25 ENCOUNTER — HEALTH MAINTENANCE LETTER (OUTPATIENT)
Age: 28
End: 2023-11-25

## 2024-02-26 ENCOUNTER — MYC MEDICAL ADVICE (OUTPATIENT)
Dept: FAMILY MEDICINE | Facility: CLINIC | Age: 29
End: 2024-02-26

## 2024-02-26 NOTE — TELEPHONE ENCOUNTER
Patient Quality Outreach    Patient is due for the following:   Diabetes -  Diabetic Follow-Up Visit    Next Steps:   Schedule a office visit for diabetes    Type of outreach:    Sent [employer] message.      Questions for provider review:    None           Lori Fry, CMA

## 2024-04-18 DIAGNOSIS — E10.65 TYPE 1 DIABETES MELLITUS WITH HYPERGLYCEMIA (H): ICD-10-CM

## 2024-04-18 RX ORDER — INSULIN DEGLUDEC 100 U/ML
INJECTION, SOLUTION SUBCUTANEOUS
Qty: 30 ML | Refills: 3 | Status: SHIPPED | OUTPATIENT
Start: 2024-04-18

## 2024-04-18 NOTE — TELEPHONE ENCOUNTER
INSULIN DEGLUDEC PEN (U-100)    Inject 32 Units Subcutaneous daily   Last Written Prescription Date:  9/3022  Last Fill Quantity: 45 ml ,   # refills: 2  Last Office Visit :  11/11/2022  Future Office visit:  None     Routing refill request to provider for review/approval because:  Insulin and insulin pump supplies - refilled per Endocrine clinic.  Epic med list>   Insulin Glargine-yfgn (SEMGLEE, YFNABIL,) 100 UNIT/ML SOPN   Inject 32 Units Subcutaneous daily Add 2 units to prime pen.

## 2024-06-22 ENCOUNTER — HEALTH MAINTENANCE LETTER (OUTPATIENT)
Age: 29
End: 2024-06-22

## 2025-01-04 ENCOUNTER — HEALTH MAINTENANCE LETTER (OUTPATIENT)
Age: 30
End: 2025-01-04

## 2025-04-03 ENCOUNTER — TELEPHONE (OUTPATIENT)
Dept: EDUCATION SERVICES | Facility: CLINIC | Age: 30
End: 2025-04-03

## 2025-04-03 DIAGNOSIS — E10.65 TYPE 1 DIABETES MELLITUS WITH HYPERGLYCEMIA (H): ICD-10-CM

## 2025-04-04 NOTE — TELEPHONE ENCOUNTER
Patient has not seen Dr. Atkins since 2022. Please call patient to either schedule follow-up office visit or document if patient has changed clinics.    Please route back to pool.    Yvette Juares RN  Endocrine Care Coordinator  [facility]

## 2025-04-04 NOTE — TELEPHONE ENCOUNTER
Last Written Prescription:  Insulin Degludec FlexTouch 100 UNIT/ML SOPN 30 mL 3 4/18/2024 -- No   Sig: INJECT 32 UNITS DAILY EVERY MORNING.     ----------------------  Last Visit Date: 11-11-22  Future Visit Date: none      Refill decision: Medication unable to be refilled by RN due to: Other:  Insulin and insulin pump supplies - refilled per Endocrine clinic.         Request from pharmacy:  Requested Prescriptions   Pending Prescriptions Disp Refills    TRESIBA FLEXTOUCH 100 UNIT/ML pen [Pharmacy Med Name: TRESIBA FLEXTOUCH 100 UNIT/ML]  3     Sig: INJECT 32 UNITS DAILY EVERY MORNING.       Insulin Protocol Failed - 4/4/2025  6:55 AM        Failed - HgbA1C in past 3 or 6 months     If HgbA1C is 8 or greater, it needs to be on file within the past 3 months.  If less than 8, must be on file within the past 6 months.     Recent Labs   Lab Test 05/08/23  1056 11/11/22  1202   A1C 10.0*  --    HEMOGLOBINA1  --  9.2*             Failed - Recent (6 mo) or future (90 days) visit within the authorizing provider's specialty     The patient must have completed an in-person or virtual visit within the past 6 months or has a future visit scheduled within the next 90 days with the authorizing provider s specialty.  Urgent care and e-visits do not quality as an office visit for this protocol.          Failed - Chart review required     Review Chart.    Do not approve if insulin is used in a pump.  Instead, direct refill request to the patient's endocrinologist.  If the patient doesn't have an endocrinologist, then send the refill to the patient's PCP for review            Passed - Medication is active on med list and the sig matches. RN to manually verify dose and sig if red X/fail.     If the protocol passes (green check), you do not need to verify med dose and sig.    A prescription matches if they are the same clinical intention.    For Example: once daily and every morning are the same.    The protocol can not identify upper  and lower case letters as matching and will fail.     For Example: Take 1 tablet (50 mg) by mouth daily     TAKE 1 TABLET (50 MG) BY MOUTH DAILY    For all fails (red x), verify dose and sig.    If the refill does match what is on file, the RN can still proceed to approve the refill request.       If they do not match, route to the appropriate provider.             Passed - Medication indicated for associated diagnosis     Medication is associated with one or more of the following diagnoses:   - Type 1 diabetes mellitus  - Type 2 diabetes mellitus  - Diabetic nephropathy; Prophylaxis  - Neuropathy due to diabetes mellitus; Prophylaxis  - Retinopathy due to diabetes mellitus; Prophylaxis  - Diabetes mellitus associated with cystic fibrosis  - Disorder of cardiovascular system; Prophylaxis - Type 1 diabetes mellitus   - Disorder of cardiovascular system; Prophylaxis - Type 2 diabetes mellitus            Passed - Patient is 18 years of age or older

## 2025-04-04 NOTE — TELEPHONE ENCOUNTER
4/4 Called and left voicemail, provided phone number [phone] to schedule a follow up appointment with Dr. Atkins.     [facility]   Orthopedics, Podiatry, Sports Medicine, Ent ,Eye , Audiology, Adult Endocrine & Diabetes, Nutrition & Medication Therapy Management Specialties   [facility]

## 2025-04-08 NOTE — TELEPHONE ENCOUNTER
4/8 2nd attempt.  Called and left voicemail, provided phone number [phone] to schedule a follow up appointment with Dr. Atkins.      [facility]   Orthopedics, Podiatry, Sports Medicine, Ent ,Eye , Audiology, Adult Endocrine & Diabetes, Nutrition & Medication Therapy Management Specialties   [facility]

## 2025-04-09 NOTE — TELEPHONE ENCOUNTER
Called and LVM provided phone number [phone] to schedule a follow up appointment with Dr. Atkins. Asked pt to inform us if he has established elsewhere.    [facility]jiv[facility]  [facility]

## 2025-04-15 RX ORDER — INSULIN DEGLUDEC 100 U/ML
INJECTION, SOLUTION SUBCUTANEOUS
Qty: 15 ML | Refills: 0 | OUTPATIENT
Start: 2025-04-15

## 2025-06-02 DIAGNOSIS — E10.65 TYPE 1 DIABETES MELLITUS WITH HYPERGLYCEMIA (H): ICD-10-CM

## 2025-06-04 NOTE — TELEPHONE ENCOUNTER
Last Written Prescription:  Insulin Degludec FlexTouch 100 UNIT/ML SOPN 30 mL 3 4/18/2024     ----------------------  Last Visit Date: 11/11/22  Future Visit Date: 6/13/25  ----------------------    Refill decision: Medication unable to be refilled by RN due to: Medication not included in refill protocol policy     Insulin and insulin pump supplies - refilled per Endocrine clinic.      Request from pharmacy:  Requested Prescriptions   Pending Prescriptions Disp Refills    TRESIBA FLEXTOUCH 100 UNIT/ML pen [Pharmacy Med Name: TRESIBA FLEXTOUCH 100 UNIT/ML]  3     Sig: INJECT 32 UNITS DAILY EVERY MORNING.       Insulin Protocol Failed - 6/4/2025  9:08 AM        Failed - HgbA1C in past 3 or 6 months     If HgbA1C is 8 or greater, it needs to be on file within the past 3 months.  If less than 8, must be on file within the past 6 months.     Recent Labs   Lab Test 05/08/23  1056 11/11/22  1202   A1C 10.0*  --    HEMOGLOBINA1  --  9.2*             Failed - Recent (6 month) or future (90 days) visit with the authorizing provider's specialty (provided they have been seen in the past 9 months)     The patient must have completed an in-person or virtual visit within the past 6 months or has a future visit scheduled within the next 90 days with the authorizing provider s specialty.  Urgent care and e-visits do not quality as an office visit for this protocol.          Failed - Chart review required     Review Chart.    Do not approve if insulin is used in a pump.  Instead, direct refill request to the patient's endocrinologist.  If the patient doesn't have an endocrinologist, then send the refill to the patient's PCP for review            Passed - Medication is active on med list and the sig matches. RN to manually verify dose and sig if red X/fail.     If the protocol passes (green check), you do not need to verify med dose and sig.    A prescription matches if they are the same clinical intention.    For Example: once daily  and every morning are the same.    The protocol can not identify upper and lower case letters as matching and will fail.     For Example: Take 1 tablet (50 mg) by mouth daily     TAKE 1 TABLET (50 MG) BY MOUTH DAILY    For all fails (red x), verify dose and sig.    If the refill does match what is on file, the RN can still proceed to approve the refill request.       If they do not match, route to the appropriate provider.             Passed - Medication indicated for associated diagnosis     Medication is associated with one or more of the following diagnoses:   - Type 1 diabetes mellitus  - Type 2 diabetes mellitus  - Diabetic nephropathy; Prophylaxis  - Neuropathy due to diabetes mellitus; Prophylaxis  - Retinopathy due to diabetes mellitus; Prophylaxis  - Diabetes mellitus associated with cystic fibrosis  - Disorder of cardiovascular system; Prophylaxis - Type 1 diabetes mellitus   - Disorder of cardiovascular system; Prophylaxis - Type 2 diabetes mellitus            Passed - Patient is 18 years of age or older

## 2025-06-04 NOTE — TELEPHONE ENCOUNTER
Patient last seen 11/2022.    Upcoming visit with Dr. Atkins 6/13.    Forwarding to Dr. Atkins for review per Endocrine Refill Protocol.    Christina Christianson RN, [facility]  Diabetes Education Department  [facility] [place]

## 2025-06-05 RX ORDER — INSULIN DEGLUDEC 100 U/ML
INJECTION, SOLUTION SUBCUTANEOUS
Qty: 30 ML | Refills: 3 | Status: SHIPPED | OUTPATIENT
Start: 2025-06-05

## 2025-07-12 ENCOUNTER — HEALTH MAINTENANCE LETTER (OUTPATIENT)
Age: 30
End: 2025-07-12